# Patient Record
Sex: FEMALE | Race: BLACK OR AFRICAN AMERICAN | Employment: OTHER | URBAN - METROPOLITAN AREA
[De-identification: names, ages, dates, MRNs, and addresses within clinical notes are randomized per-mention and may not be internally consistent; named-entity substitution may affect disease eponyms.]

---

## 2018-08-31 DIAGNOSIS — G40.219 PARTIAL EPILEPSY WITH IMPAIRMENT OF CONSCIOUSNESS, INTRACTABLE, POORLY CONTROLLED (HCC): ICD-10-CM

## 2018-08-31 RX ORDER — LEVETIRACETAM 100 MG/ML
SOLUTION ORAL
Qty: 1 BOTTLE | Refills: 11 | Status: SHIPPED
Start: 2018-08-31 | End: 2020-03-18 | Stop reason: SDUPTHER

## 2018-10-10 ENCOUNTER — OFFICE VISIT (OUTPATIENT)
Dept: NEUROLOGY | Age: 28
End: 2018-10-10
Payer: MEDICARE

## 2018-10-10 VITALS
OXYGEN SATURATION: 100 % | WEIGHT: 80 LBS | BODY MASS INDEX: 18.52 KG/M2 | HEIGHT: 55 IN | HEART RATE: 97 BPM | DIASTOLIC BLOOD PRESSURE: 65 MMHG | SYSTOLIC BLOOD PRESSURE: 169 MMHG

## 2018-10-10 DIAGNOSIS — G40.219 PARTIAL EPILEPSY WITH IMPAIRMENT OF CONSCIOUSNESS, INTRACTABLE, POORLY CONTROLLED (HCC): Primary | ICD-10-CM

## 2018-10-10 PROCEDURE — 1036F TOBACCO NON-USER: CPT | Performed by: CLINICAL NURSE SPECIALIST

## 2018-10-10 PROCEDURE — G8427 DOCREV CUR MEDS BY ELIG CLIN: HCPCS | Performed by: CLINICAL NURSE SPECIALIST

## 2018-10-10 PROCEDURE — G8484 FLU IMMUNIZE NO ADMIN: HCPCS | Performed by: CLINICAL NURSE SPECIALIST

## 2018-10-10 PROCEDURE — 99214 OFFICE O/P EST MOD 30 MIN: CPT | Performed by: CLINICAL NURSE SPECIALIST

## 2018-10-10 PROCEDURE — G8420 CALC BMI NORM PARAMETERS: HCPCS | Performed by: CLINICAL NURSE SPECIALIST

## 2018-10-10 RX ORDER — LEVOCETIRIZINE DIHYDROCHLORIDE 5 MG/1
2.5 TABLET, FILM COATED ORAL NIGHTLY
COMMUNITY
End: 2020-09-01 | Stop reason: SDUPTHER

## 2018-10-10 NOTE — PROGRESS NOTES
microcephalic  Neck: no adenopathy and no carotid bruit  Lungs: clear to auscultation bilaterally  Heart: regular rate and rhythm  Extremities: extremities normal, atraumatic, no cyanosis or edema  Pulses: 2+ and symmetric  Skin:  No rashes or lesions     Mental Status: awake, nonverbal, not following commands    Cranial Nerves:  I: smell    II: visual acuity     II: visual fields Bilateral threat   II: pupils MERCEDES   III,VII: ptosis    III,IV,VI: extraocular muscles  Primary gaze nystagmus, but she did look around the room   V: mastication    V: facial light touch sensation  Normal   V,VII: corneal reflex  Present   VII: facial muscle function - upper     VII: facial muscle function - lower Normal   VIII: hearing    IX: soft palate elevation     IX,X: gag reflex Present   XI: trapezius strength     XI: sternocleidomastoid strength    XI: neck extension strength     XII: tongue strength       Decreased bulk throughout    Sporadic movement of her left arm and lower extremities   Minimal sporadic movement noted in left arm    Withdrew to PP and vibration throughout   Pushed examiner away with left arm while assessing right arm    Spastic diplegia noted   pseudocontracture right arm    Currently in a wheelchair    Laboratory/Radiology:     Labs drawn quarterly reportedly at facility reportedly   None to personally review at this time      They will fax me results     Assessment:     CPS with secondary generalization   No reported seizure in many years    None since following in our office in 2013     Plan:     continue Keppra to 500mg BID    Call if new difficulties    RTO 12 months    Brit Tavera  9:41 AM  10/10/2018

## 2019-02-23 LAB
ALBUMIN SERPL-MCNC: NORMAL G/DL
ALP BLD-CCNC: NORMAL U/L
ALT SERPL-CCNC: NORMAL U/L
ANION GAP SERPL CALCULATED.3IONS-SCNC: NORMAL MMOL/L
AST SERPL-CCNC: NORMAL U/L
BASOPHILS ABSOLUTE: NORMAL
BASOPHILS RELATIVE PERCENT: NORMAL
BILIRUB SERPL-MCNC: NORMAL MG/DL
BUN BLDV-MCNC: NORMAL MG/DL
CALCIUM SERPL-MCNC: NORMAL MG/DL
CHLORIDE BLD-SCNC: NORMAL MMOL/L
CHOLESTEROL, TOTAL: NORMAL
CHOLESTEROL/HDL RATIO: NORMAL
CO2: NORMAL
CREAT SERPL-MCNC: NORMAL MG/DL
EOSINOPHILS ABSOLUTE: NORMAL
EOSINOPHILS RELATIVE PERCENT: NORMAL
GFR CALCULATED: NORMAL
GLUCOSE BLD-MCNC: NORMAL MG/DL
HCT VFR BLD CALC: NORMAL %
HDLC SERPL-MCNC: NORMAL MG/DL
HEMOGLOBIN: NORMAL
LDL CHOLESTEROL CALCULATED: NORMAL
LYMPHOCYTES ABSOLUTE: NORMAL
LYMPHOCYTES RELATIVE PERCENT: NORMAL
MCH RBC QN AUTO: NORMAL PG
MCHC RBC AUTO-ENTMCNC: NORMAL G/DL
MCV RBC AUTO: NORMAL FL
MONOCYTES ABSOLUTE: NORMAL
MONOCYTES RELATIVE PERCENT: NORMAL
NEUTROPHILS ABSOLUTE: NORMAL
NEUTROPHILS RELATIVE PERCENT: NORMAL
PLATELET # BLD: NORMAL 10*3/UL
PMV BLD AUTO: NORMAL FL
POTASSIUM SERPL-SCNC: NORMAL MMOL/L
RBC # BLD: NORMAL 10*6/UL
SODIUM BLD-SCNC: NORMAL MMOL/L
TOTAL PROTEIN: NORMAL
TRIGL SERPL-MCNC: NORMAL MG/DL
VLDLC SERPL CALC-MCNC: NORMAL MG/DL
WBC # BLD: NORMAL 10*3/UL

## 2019-03-01 ENCOUNTER — TELEPHONE (OUTPATIENT)
Dept: NEUROLOGY | Age: 29
End: 2019-03-01

## 2019-10-29 ENCOUNTER — TELEPHONE (OUTPATIENT)
Dept: ADMINISTRATIVE | Age: 29
End: 2019-10-29

## 2020-03-18 RX ORDER — LEVETIRACETAM 100 MG/ML
SOLUTION ORAL
Qty: 1 BOTTLE | Refills: 11 | Status: SHIPPED
Start: 2020-03-18 | End: 2020-08-28 | Stop reason: SDUPTHER

## 2020-07-07 ENCOUNTER — TELEPHONE (OUTPATIENT)
Dept: NEUROLOGY | Age: 30
End: 2020-07-07

## 2020-07-07 NOTE — TELEPHONE ENCOUNTER
Caregiver Neha called from [facility]. Patient had  Seizure this am. PCP notified and order labs. FYI.   Electronically signed by Tyron Guevara MA on 7/7/20 at 2:49 PM EDT

## 2020-07-16 VITALS — SYSTOLIC BLOOD PRESSURE: 97 MMHG | HEART RATE: 64 BPM | DIASTOLIC BLOOD PRESSURE: 56 MMHG | TEMPERATURE: 96.6 F

## 2020-07-20 ENCOUNTER — OUTSIDE SERVICES (OUTPATIENT)
Dept: PRIMARY CARE CLINIC | Age: 30
End: 2020-07-20
Payer: MEDICARE

## 2020-07-20 VITALS — SYSTOLIC BLOOD PRESSURE: 104 MMHG | TEMPERATURE: 98.1 F | HEART RATE: 97 BPM | DIASTOLIC BLOOD PRESSURE: 63 MMHG

## 2020-07-20 PROCEDURE — 99349 HOME/RES VST EST MOD MDM 40: CPT | Performed by: NURSE PRACTITIONER

## 2020-07-20 RX ORDER — ACETAMINOPHEN 160 MG/5ML
325 SUSPENSION, ORAL (FINAL DOSE FORM) ORAL EVERY 4 HOURS PRN
Qty: 240 ML | Refills: 3 | Status: SHIPPED
Start: 2020-07-20 | End: 2020-09-23

## 2020-07-20 ASSESSMENT — ENCOUNTER SYMPTOMS
COUGH: 0
BLOOD IN STOOL: 0
NAUSEA: 0
CHEST TIGHTNESS: 0
SHORTNESS OF BREATH: 0
DIARRHEA: 0
VOMITING: 0
TROUBLE SWALLOWING: 0
ABDOMINAL PAIN: 0
BACK PAIN: 0
VOICE CHANGE: 0
CONSTIPATION: 0
WHEEZING: 0
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
## 2020-07-21 NOTE — PATIENT INSTRUCTIONS
Patient Education        Seizure: Care Instructions  Your Care Instructions     Seizures are caused by abnormal patterns of electrical signals in the brain. They are different for each person. Seizures can affect movement, speech, vision, or awareness. Some people have only slight shaking of a hand and do not pass out. Other people may pass out and have violent shaking of the whole body. Some people appear to stare into space. They are awake, but they can't respond normally. Later, they may not remember what happened. You may need tests to identify the type and cause of the seizures. A seizure may occur only once, or you may have them more than one time. Taking medicines as directed and following up with your doctor may help keep you from having more seizures. The doctor has checked you carefully, but problems can develop later. If you notice any problems or new symptoms, get medical treatment right away. Follow-up care is a key part of your treatment and safety. Be sure to make and go to all appointments, and call your doctor if you are having problems. It's also a good idea to know your test results and keep a list of the medicines you take. How can you care for yourself at home? · Be safe with medicines. Take your medicines exactly as prescribed. Call your doctor if you think you are having a problem with your medicine. · Do not do any activity that could be dangerous to you or others until your doctor says it is safe to do so. For example, do not drive a car, operate machinery, swim, or climb ladders. · Be sure that anyone treating you for any health problem knows that you have had a seizure and what medicines you are taking for it. · Identify and avoid things that may make you more likely to have a seizure. These may include lack of sleep, alcohol or drug use, stress, or not eating. · Make sure you go to your follow-up appointment. When should you call for help?    [id number] anytime you think you may need emergency care. For example, call if:  · You have another seizure. · You have new symptoms, such as trouble walking, speaking, or thinking clearly. Call your doctor now or seek immediate medical care if:  · You are not acting normally. Watch closely for changes in your health, and be sure to contact your doctor if you have any problems. Where can you learn more? Go to [email or web address] and sign in to your [employer] account. Enter E902 in the [employer] box to learn more about \"Seizure: Care Instructions. \"     If you do not have an account, please click on the \"Sign Up Now\" link. Current as of: November 20, 2019               Content Version: 12.5  © 6196-2301 Healthwise, Incorporated. Care instructions adapted under license by [facility] ([facility]). If you have questions about a medical condition or this instruction, always ask your healthcare professional. [address] any warranty or liability for your use of this information.

## 2020-07-30 LAB
ALBUMIN SERPL-MCNC: 4.2 G/DL
ALP BLD-CCNC: 60 U/L
ALT SERPL-CCNC: 16 U/L
ANION GAP SERPL CALCULATED.3IONS-SCNC: 1.3 MMOL/L
AST SERPL-CCNC: 23 U/L
BASOPHILS ABSOLUTE: 0.1 /ΜL
BASOPHILS RELATIVE PERCENT: 0.9 %
BILIRUB SERPL-MCNC: 0.7 MG/DL (ref 0.1–1.4)
BUN BLDV-MCNC: 10 MG/DL
CALCIUM SERPL-MCNC: 9.3 MG/DL
CHLORIDE BLD-SCNC: 107 MMOL/L
CHOLESTEROL, TOTAL: 189 MG/DL
CHOLESTEROL/HDL RATIO: 1.7
CO2: 24 MMOL/L
CREAT SERPL-MCNC: 0.7 MG/DL
EOSINOPHILS ABSOLUTE: 0 /ΜL
EOSINOPHILS RELATIVE PERCENT: 0.6 %
GFR CALCULATED: NORMAL
GLUCOSE BLD-MCNC: 78 MG/DL
HCT VFR BLD CALC: 42.6 % (ref 36–46)
HDLC SERPL-MCNC: 67 MG/DL (ref 35–70)
HEMOGLOBIN: 14.2 G/DL (ref 12–16)
LDL CHOLESTEROL CALCULATED: 115 MG/DL (ref 0–160)
LYMPHOCYTES ABSOLUTE: 2.6 /ΜL
LYMPHOCYTES RELATIVE PERCENT: 42.1 %
MCH RBC QN AUTO: 28.3 PG
MCHC RBC AUTO-ENTMCNC: 33.4 G/DL
MCV RBC AUTO: 84.7 FL
MONOCYTES ABSOLUTE: 0.8 /ΜL
MONOCYTES RELATIVE PERCENT: 12.5 %
NEUTROPHILS ABSOLUTE: 2.7 /ΜL
NEUTROPHILS RELATIVE PERCENT: 43.9 %
PDW BLD-RTO: 12.5 %
PLATELET # BLD: 229 K/ΜL
PMV BLD AUTO: 8 FL
POTASSIUM SERPL-SCNC: 3.6 MMOL/L
RBC # BLD: 5.03 10^6/ΜL
SODIUM BLD-SCNC: 139 MMOL/L
T4 FREE: 1.12
TOTAL PROTEIN: 7.5
TRIGL SERPL-MCNC: 44 MG/DL
TSH SERPL DL<=0.05 MIU/L-ACNC: 1.94 UIU/ML
VITAMIN B-12: 420
VITAMIN D 25-HYDROXY: 16
VITAMIN D2, 25 HYDROXY: NORMAL
VITAMIN D3,25 HYDROXY: NORMAL
VLDLC SERPL CALC-MCNC: NORMAL MG/DL
WBC # BLD: 6.1 10^3/ML

## 2020-08-03 ENCOUNTER — OFFICE VISIT (OUTPATIENT)
Dept: PRIMARY CARE CLINIC | Age: 30
End: 2020-08-03
Payer: MEDICARE

## 2020-08-03 VITALS
DIASTOLIC BLOOD PRESSURE: 58 MMHG | HEIGHT: 55 IN | TEMPERATURE: 97.2 F | BODY MASS INDEX: 18.52 KG/M2 | OXYGEN SATURATION: 96 % | WEIGHT: 80 LBS | HEART RATE: 63 BPM | SYSTOLIC BLOOD PRESSURE: 98 MMHG

## 2020-08-03 PROCEDURE — 99214 OFFICE O/P EST MOD 30 MIN: CPT | Performed by: NURSE PRACTITIONER

## 2020-08-03 RX ORDER — AMOXICILLIN 500 MG/1
500 CAPSULE ORAL 3 TIMES DAILY
Qty: 21 CAPSULE | Refills: 0 | Status: SHIPPED
Start: 2020-08-03 | End: 2020-08-03

## 2020-08-03 RX ORDER — AMOXICILLIN 250 MG/5ML
500 POWDER, FOR SUSPENSION ORAL 3 TIMES DAILY
Qty: 210 ML | Refills: 0 | Status: SHIPPED | OUTPATIENT
Start: 2020-08-03 | End: 2020-08-10

## 2020-08-03 ASSESSMENT — ENCOUNTER SYMPTOMS
NAUSEA: 0
BACK PAIN: 0
VOMITING: 0
ABDOMINAL PAIN: 0

## 2020-08-03 ASSESSMENT — PATIENT HEALTH QUESTIONNAIRE - PHQ9: DEPRESSION UNABLE TO ASSESS: FUNCTIONAL CAPACITY MOTIVATION LIMITS ACCURACY

## 2020-08-03 NOTE — PROGRESS NOTES
8/3/20  Luiz Burgos : 1990 Sex: female  Age: 31 y.o. Chief Complaint   Patient presents with    Seizures    Facial Swelling       Patient apparently has a seizure disorder and has not had a seizure in a long time but after having this last 1 is going to see the neurologist.    She is also having some left-sided swelling on the face possibly a dental abscess and has an appointment with dental here shortly. Patients condition does not afford us a good historian      Review of Systems   Constitutional: Negative for chills, diaphoresis, fatigue and fever. Gastrointestinal: Negative for abdominal pain, nausea and vomiting. Genitourinary: Negative for decreased urine volume, difficulty urinating, dysuria, flank pain, frequency, hematuria and urgency. Musculoskeletal: Positive for gait problem. Negative for back pain. Psychiatric/Behavioral: Negative for confusion.          Current Outpatient Medications:     amoxicillin (AMOXIL) 250 MG/5ML suspension, Take 10 mLs by mouth 3 times daily for 7 days, Disp: 210 mL, Rfl: 0    acetaminophen (TYLENOL) 160 MG/5ML suspension, Take 10.16 mLs by mouth every 4 hours as needed for Fever (greater than 100.6), Disp: 240 mL, Rfl: 3    ibuprofen (CHILDRENS ADVIL) 100 MG/5ML suspension, Take 10 mLs by mouth every 6 hours as needed for Pain, Disp: 1 Bottle, Rfl: 3    levETIRAcetam (KEPPRA) 100 MG/ML solution, Take 1 teaspoonful (5ml) twice daily, Disp: 1 Bottle, Rfl: 11    bismuth subsalicylate (BISMATROL) 262 MG/15ML suspension, Take 15 mLs by mouth every 6 hours as needed for Indigestion, Disp: , Rfl:     levocetirizine (XYZAL) 5 MG tablet, Take 2.5 mg by mouth nightly, Disp: , Rfl:     medroxyPROGESTERone (DEPO-PROVERA) 150 MG/ML injection, Inject 150 mg into the muscle every 3 months, Disp: , Rfl:     hydrOXYzine (ATARAX) 25 MG tablet, Take 25 mg by mouth daily, Disp: , Rfl:     cetirizine (ZYRTEC) 1 MG/ML syrup, Take by mouth daily 1-2 teaspoonful at bedtime, Disp: , Rfl:     polyethylene glycol (GLYCOLAX) powder, Take by mouth daily Pt takes 1 tablespoon 3 times a week m-w-f- at 7am, Disp: , Rfl:   No Known Allergies    Past Medical History:   Diagnosis Date    Allergic rhinitis     Cerebral palsy (HCC)     Decreased vision     Eczema     Hemiparesis (HCC)     Mental disability     Nystagmus     Seizures (HCC)         Vitals:    08/03/20 1131 08/03/20 1135   BP: (!) 98/58 (!) 98/58   Pulse: 63    Temp: 97.2 °F (36.2 °C)    TempSrc: Temporal    SpO2: 96%    Weight: 80 lb (36.3 kg)    Height: 4' (1.219 m)        Physical Exam  Constitutional:       Appearance: Normal appearance. HENT:      Head: Normocephalic and atraumatic. Neck:      Musculoskeletal: Normal range of motion. Cardiovascular:      Rate and Rhythm: Normal rate and regular rhythm. Abdominal:      General: Abdomen is flat. There is no distension. Tenderness: There is no abdominal tenderness. There is no guarding. Musculoskeletal: Normal range of motion. Skin:     General: Skin is warm. Capillary Refill: Capillary refill takes 2 to 3 seconds. Neurological:      Mental Status: She is alert and oriented to person, place, and time. Assessment and Plan:    There are no diagnoses linked to this encounter. No follow-ups on file. Labs reviewed no remarkable findings noted   We will be putting her on amoxicillin liquid unsure whether she would be able to tolerate the pills. Also advised to not cancel her dental appointment even if symptoms resolve. We discussed labs today all seem to be very good we will repeat labs in 6 months.            Seen By:  DANI Grant - ARLYN

## 2020-08-25 ENCOUNTER — OUTSIDE SERVICES (OUTPATIENT)
Dept: PRIMARY CARE CLINIC | Age: 30
End: 2020-08-25
Payer: MEDICARE

## 2020-08-25 PROCEDURE — 99349 HOME/RES VST EST MOD MDM 40: CPT | Performed by: NURSE PRACTITIONER

## 2020-08-25 NOTE — PROGRESS NOTES
20  Eunice Bruce : 1990 Sex: female  Age: 31 y.o. Chief Complaint   Patient presents with    Dental Problem     She was recently evaluated at the emergency department for altered mental status, which was subsequently noted to be from a dental abscess       She was recently evaluated at the emergency department for altered mental status. Staff notes that she just was not acting correctly. After evaluation in the ER it was noted that she had a recurrent dental abscess and was subsequently referred to the dental clinic. She was given a prescription for Pen-Vee K. Staff reports she was given 1 dose in the emergency room and was prescribed a 10-day dose, that will start tomorrow. Staff reports that she is returned to her baseline status      Review of Systems   Unable to perform ROS: Patient nonverbal   Staff denies any acute issues with her at this time, other than a dental abscess.       Current Outpatient Medications:     acetaminophen (TYLENOL) 160 MG/5ML suspension, Take 10.16 mLs by mouth every 4 hours as needed for Fever (greater than 100.6), Disp: 240 mL, Rfl: 3    ibuprofen (CHILDRENS ADVIL) 100 MG/5ML suspension, Take 10 mLs by mouth every 6 hours as needed for Pain, Disp: 1 Bottle, Rfl: 3    levETIRAcetam (KEPPRA) 100 MG/ML solution, Take 1 teaspoonful (5ml) twice daily, Disp: 1 Bottle, Rfl: 11    bismuth subsalicylate (BISMATROL) 262 MG/15ML suspension, Take 15 mLs by mouth every 6 hours as needed for Indigestion, Disp: , Rfl:     levocetirizine (XYZAL) 5 MG tablet, Take 2.5 mg by mouth nightly, Disp: , Rfl:     medroxyPROGESTERone (DEPO-PROVERA) 150 MG/ML injection, Inject 150 mg into the muscle every 3 months, Disp: , Rfl:     hydrOXYzine (ATARAX) 25 MG tablet, Take 25 mg by mouth daily, Disp: , Rfl:     cetirizine (ZYRTEC) 1 MG/ML syrup, Take by mouth daily 1-2 teaspoonful at bedtime, Disp: , Rfl:     polyethylene glycol (GLYCOLAX) powder, Take by mouth daily Pt takes 1 tablespoon 3 times a week m-w-f- at 7am, Disp: , Rfl:   No Known Allergies    Past Medical History:   Diagnosis Date    Allergic rhinitis     Cerebral palsy (HCC)     Decreased vision     Eczema     Hemiparesis (HCC)     Mental disability     Nystagmus     Seizures ([facility] [address]      History reviewed. No pertinent surgical history. History reviewed. No pertinent family history. Social History     Socioeconomic History    Marital status: Single     Spouse name: Not on file    Number of children: Not on file    Years of education: Not on file    Highest education level: Not on file   Occupational History    Not on file   Social Needs    Financial resource strain: Not on file    Food insecurity     Worry: Not on file     Inability: Not on file    Transportation needs     Medical: Not on file     Non-medical: Not on file   Tobacco Use    Smoking status: Never Smoker    Smokeless tobacco: Never Used   Substance and Sexual Activity    Alcohol use: No    Drug use: No    Sexual activity: Never   Lifestyle    Physical activity     Days per week: Not on file     Minutes per session: Not on file    Stress: Not on file   Relationships    Social connections     Talks on phone: Not on file     Gets together: Not on file     Attends Jew service: Not on file     Active member of club or organization: Not on file     Attends meetings of clubs or organizations: Not on file     Relationship status: Not on file    Intimate partner violence     Fear of current or ex partner: Not on file     Emotionally abused: Not on file     Physically abused: Not on file     Forced sexual activity: Not on file   Other Topics Concern    Not on file   Social History Narrative    Not on file       There were no vitals filed for this visit. Physical Exam  Vitals signs and nursing note reviewed. Constitutional:       General: She is awake. She is not in acute distress. Appearance: Normal appearance.  She is not ill-appearing, toxic-appearing or diaphoretic. HENT:      Head: Normocephalic and atraumatic. Right Ear: Ear canal and external ear normal.      Left Ear: Ear canal and external ear normal.      Nose: Nose normal. No congestion or rhinorrhea. Mouth/Throat:      Mouth: Mucous membranes are moist.      Dentition: Abnormal dentition. Dental caries and dental abscesses (Left upper) present. Pharynx: No oropharyngeal exudate or posterior oropharyngeal erythema. Eyes:      General: Lids are normal. No scleral icterus. Right eye: No discharge. Left eye: No discharge. Extraocular Movements: Extraocular movements intact. Right eye: Nystagmus (Chronic issue) present. Conjunctiva/sclera: Conjunctivae normal.      Right eye: Right conjunctiva is not injected. Left eye: Left conjunctiva is not injected. Pupils: Pupils are equal, round, and reactive to light. Neck:      Musculoskeletal: Full passive range of motion without pain, normal range of motion and neck supple. No neck rigidity or muscular tenderness. Thyroid: No thyromegaly. Vascular: No carotid bruit. Cardiovascular:      Rate and Rhythm: Normal rate and regular rhythm. Pulses: Normal pulses. Heart sounds: Normal heart sounds, S1 normal and S2 normal. No murmur. No friction rub. No gallop. Pulmonary:      Effort: Pulmonary effort is normal. No tachypnea, accessory muscle usage or respiratory distress. Breath sounds: Normal breath sounds and air entry. No stridor. No wheezing, rhonchi or rales. Chest:      Chest wall: No tenderness. Abdominal:      General: Bowel sounds are normal. There is no distension. Palpations: Abdomen is soft. There is no mass. Tenderness: There is no abdominal tenderness. There is no right CVA tenderness, left CVA tenderness, guarding or rebound. Hernia: No hernia is present.    Musculoskeletal:         General: No swelling, tenderness, deformity or signs of injury. Right shoulder: She exhibits decreased range of motion (Chronic deformity noted). Right elbow: She exhibits decreased range of motion (Chronic deformity noted). Right wrist: She exhibits decreased range of motion (Chronic deformity noted). Right lower leg: No edema. Left lower leg: No edema. Lymphadenopathy:      Cervical: No cervical adenopathy. Skin:     General: Skin is warm and dry. Capillary Refill: Capillary refill takes less than 2 seconds. Coloration: Skin is not jaundiced or pale. Findings: No bruising, erythema, lesion or rash. Neurological:      General: No focal deficit present. Mental Status: She is alert. Mental status is at baseline. Cranial Nerves: No cranial nerve deficit. Sensory: Sensation is intact. No sensory deficit. Motor: Motor function is intact. No weakness. Coordination: Coordination normal.      Gait: Gait abnormal (Utilizes wheelchair). Deep Tendon Reflexes: Reflexes normal.   Psychiatric:         Attention and Perception: Attention and perception normal.         Mood and Affect: Mood normal. Affect is flat. Speech: She is noncommunicative. Behavior: Behavior normal. Behavior is cooperative. Thought Content: Thought content normal.         Judgment: Judgment normal.       Assessment and Plan:  Varghese Mccloud was seen today for dental problem. Diagnoses and all orders for this visit:    Hemiparesis of right dominant side due to non-cerebrovascular etiology (HCC)    Spastic hemiplegic cerebral palsy (HCC)    Seizures ([facility] [address]    Nystagmus    Mental disability    Decreased vision    Other eczema      Return in about 1 week (around 9/1/2020). I spent 20 minutes face-to-face with this patient.       Seen By:  DANI Encarnacion NP

## 2020-08-28 ENCOUNTER — OFFICE VISIT (OUTPATIENT)
Dept: NEUROLOGY | Age: 30
End: 2020-08-28
Payer: MEDICARE

## 2020-08-28 VITALS
WEIGHT: 80 LBS | HEART RATE: 89 BPM | DIASTOLIC BLOOD PRESSURE: 74 MMHG | SYSTOLIC BLOOD PRESSURE: 130 MMHG | RESPIRATION RATE: 18 BRPM | OXYGEN SATURATION: 97 % | BODY MASS INDEX: 24.41 KG/M2 | TEMPERATURE: 97.8 F

## 2020-08-28 PROCEDURE — G8427 DOCREV CUR MEDS BY ELIG CLIN: HCPCS | Performed by: CLINICAL NURSE SPECIALIST

## 2020-08-28 PROCEDURE — 99214 OFFICE O/P EST MOD 30 MIN: CPT | Performed by: CLINICAL NURSE SPECIALIST

## 2020-08-28 PROCEDURE — 1036F TOBACCO NON-USER: CPT | Performed by: CLINICAL NURSE SPECIALIST

## 2020-08-28 PROCEDURE — G8420 CALC BMI NORM PARAMETERS: HCPCS | Performed by: CLINICAL NURSE SPECIALIST

## 2020-08-28 RX ORDER — LEVETIRACETAM 100 MG/ML
SOLUTION ORAL
Qty: 1 BOTTLE | Refills: 11 | Status: SHIPPED
Start: 2020-08-28 | End: 2020-11-16 | Stop reason: SDUPTHER

## 2020-08-28 NOTE — PROGRESS NOTES
Lynsey Lake is a 31 y. o.female     Long history of seizure disorder   Follows annually since 2013    previously seeing [facility]    2 seizures since last appt   Both in July    Maintained on Keppra 500mg BID   Tolerating well -- maintained on liquid formulary    Now dealing with tooth abscess - planning on dental surgery soon   Maintained on antibiotics     ROS limited d/t patient condition     Prior to Visit Medications    Medication Sig Taking?  Authorizing Provider   levETIRAcetam (KEPPRA) 100 MG/ML solution 7.5ml twice daily Yes Kenyetta Henderson, APRN - CNS   acetaminophen (TYLENOL) 160 MG/5ML suspension Take 10.16 mLs by mouth every 4 hours as needed for Fever (greater than 100.6) Yes Sadia Lies, APRN - CNP   ibuprofen (CHILDRENS ADVIL) 100 MG/5ML suspension Take 10 mLs by mouth every 6 hours as needed for Pain Yes Sadia Lies, APRN - CNP   bismuth subsalicylate (BISMATROL) 262 MG/15ML suspension Take 15 mLs by mouth every 6 hours as needed for Indigestion Yes Historical Provider, MD   levocetirizine (XYZAL) 5 MG tablet Take 2.5 mg by mouth nightly Yes Historical Provider, MD   medroxyPROGESTERone (DEPO-PROVERA) 150 MG/ML injection Inject 150 mg into the muscle every 3 months Yes Historical Provider, MD   hydrOXYzine (ATARAX) 25 MG tablet Take 25 mg by mouth daily Yes Historical Provider, MD   cetirizine (ZYRTEC) 1 MG/ML syrup Take by mouth daily 1-2 teaspoonful at bedtime Yes Historical Provider, MD   polyethylene glycol (GLYCOLAX) powder Take by mouth daily Pt takes 1 tablespoon 3 times a week m-w-f- at 7am Yes Historical Provider, MD     Allergies as of 08/28/2020    (No Known Allergies)       Objective:     /74 (Site: Left Upper Arm, Position: Sitting, Cuff Size: Medium Adult)   Pulse 89   Temp 97.8 °F (36.6 °C)   Resp 18   Wt 80 lb (36.3 kg)   SpO2 97%   BMI 24.41 kg/m²   Afebrile      General appearance: alert, cooperative and appears younger than stated age  Head: atraumatic, microcephalic  Extremities: no cyanosis or edema  Pulses: 2+ and symmetric  Skin:  No rashes or lesions     Mental Status: awake, nonverbal, not following commands    Cranial Nerves:  I: smell    II: visual acuity     II: visual fields Bilateral threat   II: pupils MERCEDES   III,VII: ptosis    III,IV,VI: extraocular muscles  Primary gaze nystagmus, but she did look around the room   V: mastication    V: facial light touch sensation  Normal   V,VII: corneal reflex  Present   VII: facial muscle function - upper     VII: facial muscle function - lower Normal   VIII: hearing    IX: soft palate elevation     IX,X: gag reflex Present   XI: trapezius strength     XI: sternocleidomastoid strength    XI: neck extension strength     XII: tongue strength       Decreased bulk throughout    Sporadic movement of her left arm and lower extremities   Minimal sporadic movement noted in left arm    Withdrew to PP and vibration throughout   Pushed examiner away with left arm while assessing right arm    Spastic diplegia noted   pseudocontracture right arm    Currently in a wheelchair    Laboratory/Radiology:     Keppra level 14.1     Assessment:     CPS with secondary generalization   2 reported seizure in July 2020    None since following in our office in 2013     Plan:     continue Keppra to 500mg BID    Call if new difficulties    RTO 2-3 months    Ruchi Phillips  10:03 AM  8/28/2020

## 2020-09-01 RX ORDER — LEVOCETIRIZINE DIHYDROCHLORIDE 5 MG/1
TABLET, FILM COATED ORAL
Qty: 16 TABLET | Refills: 5 | Status: SHIPPED
Start: 2020-09-01 | End: 2020-11-16 | Stop reason: SDUPTHER

## 2020-09-22 ENCOUNTER — PREP FOR PROCEDURE (OUTPATIENT)
Dept: DENTISTRY | Age: 30
End: 2020-09-22

## 2020-09-22 ENCOUNTER — HOSPITAL ENCOUNTER (OUTPATIENT)
Age: 30
Discharge: HOME | End: 2020-09-24
Payer: MEDICARE

## 2020-09-22 PROCEDURE — U0003 INFECTIOUS AGENT DETECTION BY NUCLEIC ACID (DNA OR RNA); SEVERE ACUTE RESPIRATORY SYNDROME CORONAVIRUS 2 (SARS-COV-2) (CORONAVIRUS DISEASE [COVID-19]), AMPLIFIED PROBE TECHNIQUE, MAKING USE OF HIGH THROUGHPUT TECHNOLOGIES AS DESCRIBED BY CMS-2020-01-R: HCPCS

## 2020-09-22 RX ORDER — SODIUM CHLORIDE, SODIUM LACTATE, POTASSIUM CHLORIDE, CALCIUM CHLORIDE 600; 310; 30; 20 MG/100ML; MG/100ML; MG/100ML; MG/100ML
INJECTION, SOLUTION INTRAVENOUS CONTINUOUS
Status: CANCELLED | OUTPATIENT
Start: 2020-09-22

## 2020-09-22 RX ORDER — SODIUM CHLORIDE 0.9 % (FLUSH) 0.9 %
10 SYRINGE (ML) INJECTION EVERY 12 HOURS SCHEDULED
Status: CANCELLED | OUTPATIENT
Start: 2020-09-22

## 2020-09-22 RX ORDER — SODIUM CHLORIDE 0.9 % (FLUSH) 0.9 %
10 SYRINGE (ML) INJECTION PRN
Status: CANCELLED | OUTPATIENT
Start: 2020-09-22

## 2020-09-22 NOTE — PROGRESS NOTES
I spoke to Geremias Dick, [facility], she states Trisha Mederos had her covid test at [facility] today. APS[employer] guardian aware we need consents. Anna's  phone #[phone]. Fax #[phone]. States Trisha Mederos is having physical 9/23 at [facility] clinic. Will have info scanned into epic.

## 2020-09-23 ENCOUNTER — TELEPHONE (OUTPATIENT)
Dept: PRIMARY CARE CLINIC | Age: 30
End: 2020-09-23

## 2020-09-23 ENCOUNTER — OFFICE VISIT (OUTPATIENT)
Dept: PRIMARY CARE CLINIC | Age: 30
End: 2020-09-23
Payer: MEDICARE

## 2020-09-23 VITALS
TEMPERATURE: 97.5 F | HEART RATE: 76 BPM | SYSTOLIC BLOOD PRESSURE: 118 MMHG | OXYGEN SATURATION: 99 % | DIASTOLIC BLOOD PRESSURE: 72 MMHG

## 2020-09-23 PROBLEM — K02.9 DENTAL CARIES: Status: ACTIVE | Noted: 2020-09-23

## 2020-09-23 PROCEDURE — 99213 OFFICE O/P EST LOW 20 MIN: CPT | Performed by: NURSE PRACTITIONER

## 2020-09-23 ASSESSMENT — PATIENT HEALTH QUESTIONNAIRE - PHQ9
SUM OF ALL RESPONSES TO PHQ QUESTIONS 1-9: 0
SUM OF ALL RESPONSES TO PHQ QUESTIONS 1-9: 0
SUM OF ALL RESPONSES TO PHQ9 QUESTIONS 1 & 2: 0
2. FEELING DOWN, DEPRESSED OR HOPELESS: 0
1. LITTLE INTEREST OR PLEASURE IN DOING THINGS: 0

## 2020-09-23 ASSESSMENT — VISUAL ACUITY: OU: 1

## 2020-09-23 NOTE — TELEPHONE ENCOUNTER
We will await the COVID test.  I kept my mask on for the entire exam.  She did not cough during the exam.

## 2020-09-23 NOTE — PROGRESS NOTES
[address] PRE-ADMISSION TESTING GENERAL INSTRUCTIONS- [facility]-phone number:[phone]    GENERAL INSTRUCTIONS  [x] Antibacterial Soap shower Night before and/or AM of Surgery  [x] Nothing by mouth after midnight, including gum, candy, mints, or water. EXCEPT MEDS AS DIRECTED. [x] You may brush your teeth, gargle, but do NOT swallow water. [x] Jewelry, valuables or body piercing's should not be brought to the hospital. All body and/or tongue piercing's must be removed prior to arriving to hospital.  ALL hair pins must be removed. [x] Do not wear makeup, lotions, powders, deodorant. Nail polish as directed by the nurse. ONE FINGER NAIL FREE OF POLISH. [] Bring insurance card and photo ID. PARKING INSTRUCTIONS:   [x] Arrival Time: 0600. Enter the [facility] on [address] [place] [address]. Park on [facility] either in the handicap lot or the parking garage. A coin will be given to exit the parking garage. WEAR A MASK. Tell the staff you are having surgery and they will direct you to the B Elevator    [x] To reach the [facility] from [address] [place] [address], upon entering the hospital, take elevator B to the 3rd floor. MEDICATION INSTRUCTIONS:   [x]Bring a complete list of your medications, please write the last time you took the medicine, give this list to the nurse. [x] Take the following medications the morning of surgery with 1-2 ounces of water: KEPPRA  [] Stop herbal supplements and vitamins 5 days before your surgery. WHAT TO EXPECT:  [x] The day of surgery you will be greeted and checked in by the [facility]. Please bring your photo ID and insurance card. A nurse will greet you in accordance to the time you are needed in the pre-op area to prepare you for surgery. Please do not be discouraged if you are not greeted in the order you arrive as there are many variables that are involved in patient preparation.   Your patience is greatly appreciated as you wait for your nurse.     [x]  Delays may occur with surgery and staff will make a sincere effort to keep you informed of delays. If any delays occur with your procedure, we apologize ahead of time for your inconvenience as we recognize the value of your time.

## 2020-09-23 NOTE — PROGRESS NOTES
20  Brandon Calix : 1990 Sex: female  Age: 31 y.o. Chief Complaint   Patient presents with    Other     dental procedure on Friday       Elba Duarte is seen today for pre-dental procedure exam.  Staff notes that she is going to have a dental extraction due to an abscess that had developed surrounding the tooth. She has been seen in the ER and was placed on antibiotics at that time. Staff reports that the procedure will be on 2020. She has had 13 teeth removed in the past and she may have to have the rest of her teeth extracted. Staff reports that she has been acting normal, has been eating well, and moving her bowels. They note no acute issues with her.       Review of Systems   Unable to perform ROS: Patient nonverbal         Current Outpatient Medications:     levocetirizine (XYZAL) 5 MG tablet, Take 1/2 tab po qd, Disp: 16 tablet, Rfl: 5    levETIRAcetam (KEPPRA) 100 MG/ML solution, 7.5ml twice daily, Disp: 1 Bottle, Rfl: 11    bismuth subsalicylate (BISMATROL) 262 MG/15ML suspension, Take 15 mLs by mouth every 6 hours as needed for Indigestion, Disp: , Rfl:     medroxyPROGESTERone (DEPO-PROVERA) 150 MG/ML injection, Inject 150 mg into the muscle every 3 months, Disp: , Rfl:     hydrOXYzine (ATARAX) 25 MG tablet, Take 25 mg by mouth daily, Disp: , Rfl:     cetirizine (ZYRTEC) 1 MG/ML syrup, Take by mouth daily 1-2 teaspoonful at bedtime, Disp: , Rfl:     polyethylene glycol (GLYCOLAX) powder, Take by mouth daily Pt takes 1 tablespoon 3 times a week --- at 7am, Disp: , Rfl:     acetaminophen (TYLENOL) 160 MG/5ML suspension, Take 10.16 mLs by mouth every 4 hours as needed for Fever (greater than 100.6), Disp: 240 mL, Rfl: 3    ibuprofen (CHILDRENS ADVIL) 100 MG/5ML suspension, Take 10 mLs by mouth every 6 hours as needed for Pain, Disp: 1 Bottle, Rfl: 3  Allergies   Allergen Reactions    No Known Allergies        Past Medical History:   Diagnosis Date    Allergic rhinitis  Cerebral palsy (HCC)     Decreased vision     Eczema     Hemiparesis (HCC)     Mental disability     Nystagmus     Seizures ([facility] [address]      History reviewed. No pertinent surgical history. History reviewed. No pertinent family history. Social History     Socioeconomic History    Marital status: Single     Spouse name: Not on file    Number of children: Not on file    Years of education: Not on file    Highest education level: Not on file   Occupational History    Not on file   Social Needs    Financial resource strain: Not on file    Food insecurity     Worry: Not on file     Inability: Not on file    Transportation needs     Medical: Not on file     Non-medical: Not on file   Tobacco Use    Smoking status: Never Smoker    Smokeless tobacco: Never Used   Substance and Sexual Activity    Alcohol use: No    Drug use: No    Sexual activity: Never   Lifestyle    Physical activity     Days per week: Not on file     Minutes per session: Not on file    Stress: Not on file   Relationships    Social connections     Talks on phone: Not on file     Gets together: Not on file     Attends Confucianism service: Not on file     Active member of club or organization: Not on file     Attends meetings of clubs or organizations: Not on file     Relationship status: Not on file    Intimate partner violence     Fear of current or ex partner: Not on file     Emotionally abused: Not on file     Physically abused: Not on file     Forced sexual activity: Not on file   Other Topics Concern    Not on file   Social History Narrative    Not on file       Vitals:    09/23/20 0944   BP: 118/72   Pulse: 76   Temp: 97.5 °F (36.4 °C)   TempSrc: Temporal   SpO2: 99%       Physical Exam  Vitals signs and nursing note reviewed. Constitutional:       General: She is awake. She is not in acute distress. Appearance: Normal appearance. She is well-developed. She is not ill-appearing, toxic-appearing or diaphoretic.    HENT: No hernia is present. Musculoskeletal: Normal range of motion. General: No swelling, tenderness or signs of injury. Right hip: She exhibits deformity (Poorly developed and contracted). Left hip: She exhibits deformity (Poorly developed and contracted). Right lower leg: No edema. Left lower leg: No edema. Lymphadenopathy:      Cervical: No cervical adenopathy. Skin:     General: Skin is warm and dry. Capillary Refill: Capillary refill takes less than 2 seconds. Coloration: Skin is not jaundiced or pale. Findings: No bruising, erythema, lesion or rash. Neurological:      General: No focal deficit present. Mental Status: She is alert. Mental status is at baseline. Cranial Nerves: No cranial nerve deficit. Sensory: Sensation is intact. No sensory deficit. Motor: Motor function is intact. No weakness. Coordination: Coordination is intact. Coordination normal.      Gait: Gait abnormal (Uses wheelchair). Deep Tendon Reflexes: Reflexes normal.   Psychiatric:         Attention and Perception: Attention and perception normal.         Mood and Affect: Mood and affect normal.         Speech: She is noncommunicative. Behavior: Behavior normal. Behavior is cooperative. Assessment and Plan:  Jean Parson was seen today for other. Diagnoses and all orders for this visit:    Dental caries  Comments:  Having dental extractions on 09/25/2020    Spastic hemiplegic cerebral palsy (HCC)    Seizures (HCC)    Mental disability    Decreased vision    Surgical clearance form was signed. Return in about 4 weeks (around 10/21/2020) for Review of chronic conditions. I spent 15 minutes face-to-face with this patient.       Seen By:  Sherryle Maryland, APRN - NP

## 2020-09-24 LAB
SARS-COV-2: NOT DETECTED
SOURCE: NORMAL

## 2020-10-15 NOTE — PROGRESS NOTES
Spoke to Arliss Hodgkins at facility no change in pt health  Gave new date / time of surgery  Now 10-23 arrive 0530

## 2020-10-15 NOTE — PROGRESS NOTES
Patient having covid testing at [facility] on 10/19/20. Patient asked to bring ID and to self quarantine until day of procedure. [facility] notifed of covid testing date.

## 2020-10-16 RX ORDER — SODIUM CHLORIDE, SODIUM LACTATE, POTASSIUM CHLORIDE, CALCIUM CHLORIDE 600; 310; 30; 20 MG/100ML; MG/100ML; MG/100ML; MG/100ML
INJECTION, SOLUTION INTRAVENOUS CONTINUOUS
Status: CANCELLED | OUTPATIENT
Start: 2020-10-16

## 2020-10-16 RX ORDER — SODIUM CHLORIDE 0.9 % (FLUSH) 0.9 %
10 SYRINGE (ML) INJECTION EVERY 12 HOURS SCHEDULED
Status: CANCELLED | OUTPATIENT
Start: 2020-10-16

## 2020-10-16 RX ORDER — SODIUM CHLORIDE 0.9 % (FLUSH) 0.9 %
10 SYRINGE (ML) INJECTION PRN
Status: CANCELLED | OUTPATIENT
Start: 2020-10-16

## 2020-10-19 ENCOUNTER — HOSPITAL ENCOUNTER (OUTPATIENT)
Age: 30
Discharge: HOME | End: 2020-10-21
Payer: MEDICARE

## 2020-10-19 PROCEDURE — U0003 INFECTIOUS AGENT DETECTION BY NUCLEIC ACID (DNA OR RNA); SEVERE ACUTE RESPIRATORY SYNDROME CORONAVIRUS 2 (SARS-COV-2) (CORONAVIRUS DISEASE [COVID-19]), AMPLIFIED PROBE TECHNIQUE, MAKING USE OF HIGH THROUGHPUT TECHNOLOGIES AS DESCRIBED BY CMS-2020-01-R: HCPCS

## 2020-10-21 LAB
SARS-COV-2: NOT DETECTED
SOURCE: NORMAL

## 2020-10-22 ENCOUNTER — ANESTHESIA EVENT (OUTPATIENT)
Dept: OPERATING ROOM | Age: 30
End: 2020-10-22
Payer: MEDICARE

## 2020-10-23 ENCOUNTER — ANESTHESIA (OUTPATIENT)
Dept: OPERATING ROOM | Age: 30
End: 2020-10-23
Payer: MEDICARE

## 2020-10-23 ENCOUNTER — HOSPITAL ENCOUNTER (OUTPATIENT)
Age: 30
Setting detail: OUTPATIENT SURGERY
Discharge: HOME | End: 2020-10-23
Attending: DENTIST | Admitting: DENTIST
Payer: MEDICARE

## 2020-10-23 VITALS
SYSTOLIC BLOOD PRESSURE: 102 MMHG | TEMPERATURE: 97.5 F | WEIGHT: 85 LBS | BODY MASS INDEX: 19.67 KG/M2 | OXYGEN SATURATION: 97 % | HEART RATE: 88 BPM | RESPIRATION RATE: 20 BRPM | DIASTOLIC BLOOD PRESSURE: 64 MMHG | HEIGHT: 55 IN

## 2020-10-23 VITALS
RESPIRATION RATE: 13 BRPM | OXYGEN SATURATION: 100 % | DIASTOLIC BLOOD PRESSURE: 56 MMHG | TEMPERATURE: 96.6 F | SYSTOLIC BLOOD PRESSURE: 110 MMHG

## 2020-10-23 PROBLEM — K05.30 PERIODONTITIS: Chronic | Status: ACTIVE | Noted: 2020-10-23

## 2020-10-23 PROBLEM — K04.7 DENTAL ABSCESS: Chronic | Status: ACTIVE | Noted: 2020-10-23

## 2020-10-23 LAB — HCG QUALITATIVE: NEGATIVE

## 2020-10-23 PROCEDURE — 7100000000 HC PACU RECOVERY - FIRST 15 MIN: Performed by: DENTIST

## 2020-10-23 PROCEDURE — 3700000001 HC ADD 15 MINUTES (ANESTHESIA): Performed by: DENTIST

## 2020-10-23 PROCEDURE — 6370000000 HC RX 637 (ALT 250 FOR IP): Performed by: DENTIST

## 2020-10-23 PROCEDURE — 3700000000 HC ANESTHESIA ATTENDED CARE: Performed by: DENTIST

## 2020-10-23 PROCEDURE — 36415 COLL VENOUS BLD VENIPUNCTURE: CPT

## 2020-10-23 PROCEDURE — 7100000001 HC PACU RECOVERY - ADDTL 15 MIN: Performed by: DENTIST

## 2020-10-23 PROCEDURE — 6360000002 HC RX W HCPCS: Performed by: ANESTHESIOLOGIST ASSISTANT

## 2020-10-23 PROCEDURE — 2580000003 HC RX 258: Performed by: STUDENT IN AN ORGANIZED HEALTH CARE EDUCATION/TRAINING PROGRAM

## 2020-10-23 PROCEDURE — 7100000010 HC PHASE II RECOVERY - FIRST 15 MIN: Performed by: DENTIST

## 2020-10-23 PROCEDURE — 3600000002 HC SURGERY LEVEL 2 BASE: Performed by: DENTIST

## 2020-10-23 PROCEDURE — 7100000011 HC PHASE II RECOVERY - ADDTL 15 MIN: Performed by: DENTIST

## 2020-10-23 PROCEDURE — 3600000012 HC SURGERY LEVEL 2 ADDTL 15MIN: Performed by: DENTIST

## 2020-10-23 PROCEDURE — 84703 CHORIONIC GONADOTROPIN ASSAY: CPT

## 2020-10-23 PROCEDURE — 2500000003 HC RX 250 WO HCPCS: Performed by: ANESTHESIOLOGIST ASSISTANT

## 2020-10-23 RX ORDER — PROPOFOL 10 MG/ML
INJECTION, EMULSION INTRAVENOUS PRN
Status: DISCONTINUED | OUTPATIENT
Start: 2020-10-23 | End: 2020-10-23 | Stop reason: SDUPTHER

## 2020-10-23 RX ORDER — LIDOCAINE HYDROCHLORIDE 20 MG/ML
INJECTION, SOLUTION INTRAVENOUS PRN
Status: DISCONTINUED | OUTPATIENT
Start: 2020-10-23 | End: 2020-10-23 | Stop reason: SDUPTHER

## 2020-10-23 RX ORDER — MORPHINE SULFATE 2 MG/ML
2 INJECTION, SOLUTION INTRAMUSCULAR; INTRAVENOUS EVERY 5 MIN PRN
Status: DISCONTINUED | OUTPATIENT
Start: 2020-10-23 | End: 2020-10-23 | Stop reason: HOSPADM

## 2020-10-23 RX ORDER — CEFAZOLIN SODIUM 1 G/3ML
INJECTION, POWDER, FOR SOLUTION INTRAMUSCULAR; INTRAVENOUS PRN
Status: DISCONTINUED | OUTPATIENT
Start: 2020-10-23 | End: 2020-10-23 | Stop reason: SDUPTHER

## 2020-10-23 RX ORDER — SODIUM CHLORIDE 0.9 % (FLUSH) 0.9 %
10 SYRINGE (ML) INJECTION PRN
Status: DISCONTINUED | OUTPATIENT
Start: 2020-10-23 | End: 2020-10-23 | Stop reason: SDUPTHER

## 2020-10-23 RX ORDER — SODIUM CHLORIDE 0.9 % (FLUSH) 0.9 %
10 SYRINGE (ML) INJECTION EVERY 12 HOURS SCHEDULED
Status: DISCONTINUED | OUTPATIENT
Start: 2020-10-23 | End: 2020-10-23 | Stop reason: HOSPADM

## 2020-10-23 RX ORDER — MORPHINE SULFATE 2 MG/ML
1 INJECTION, SOLUTION INTRAMUSCULAR; INTRAVENOUS EVERY 5 MIN PRN
Status: DISCONTINUED | OUTPATIENT
Start: 2020-10-23 | End: 2020-10-23 | Stop reason: HOSPADM

## 2020-10-23 RX ORDER — SODIUM CHLORIDE 0.9 % (FLUSH) 0.9 %
10 SYRINGE (ML) INJECTION EVERY 12 HOURS SCHEDULED
Status: DISCONTINUED | OUTPATIENT
Start: 2020-10-23 | End: 2020-10-23 | Stop reason: SDUPTHER

## 2020-10-23 RX ORDER — PROMETHAZINE HYDROCHLORIDE 25 MG/ML
6.25 INJECTION, SOLUTION INTRAMUSCULAR; INTRAVENOUS
Status: DISCONTINUED | OUTPATIENT
Start: 2020-10-23 | End: 2020-10-23 | Stop reason: HOSPADM

## 2020-10-23 RX ORDER — MIDAZOLAM HYDROCHLORIDE 1 MG/ML
INJECTION INTRAMUSCULAR; INTRAVENOUS PRN
Status: DISCONTINUED | OUTPATIENT
Start: 2020-10-23 | End: 2020-10-23 | Stop reason: SDUPTHER

## 2020-10-23 RX ORDER — ONDANSETRON 2 MG/ML
4 INJECTION INTRAMUSCULAR; INTRAVENOUS
Status: DISCONTINUED | OUTPATIENT
Start: 2020-10-23 | End: 2020-10-23 | Stop reason: HOSPADM

## 2020-10-23 RX ORDER — SODIUM CHLORIDE, SODIUM LACTATE, POTASSIUM CHLORIDE, CALCIUM CHLORIDE 600; 310; 30; 20 MG/100ML; MG/100ML; MG/100ML; MG/100ML
INJECTION, SOLUTION INTRAVENOUS CONTINUOUS
Status: DISCONTINUED | OUTPATIENT
Start: 2020-10-23 | End: 2020-10-23 | Stop reason: HOSPADM

## 2020-10-23 RX ORDER — SODIUM CHLORIDE, SODIUM LACTATE, POTASSIUM CHLORIDE, CALCIUM CHLORIDE 600; 310; 30; 20 MG/100ML; MG/100ML; MG/100ML; MG/100ML
INJECTION, SOLUTION INTRAVENOUS CONTINUOUS
Status: DISCONTINUED | OUTPATIENT
Start: 2020-10-23 | End: 2020-10-23 | Stop reason: SDUPTHER

## 2020-10-23 RX ORDER — FENTANYL CITRATE 50 UG/ML
INJECTION, SOLUTION INTRAMUSCULAR; INTRAVENOUS PRN
Status: DISCONTINUED | OUTPATIENT
Start: 2020-10-23 | End: 2020-10-23 | Stop reason: SDUPTHER

## 2020-10-23 RX ORDER — ROCURONIUM BROMIDE 10 MG/ML
INJECTION, SOLUTION INTRAVENOUS PRN
Status: DISCONTINUED | OUTPATIENT
Start: 2020-10-23 | End: 2020-10-23 | Stop reason: SDUPTHER

## 2020-10-23 RX ORDER — ONDANSETRON 2 MG/ML
INJECTION INTRAMUSCULAR; INTRAVENOUS PRN
Status: DISCONTINUED | OUTPATIENT
Start: 2020-10-23 | End: 2020-10-23 | Stop reason: SDUPTHER

## 2020-10-23 RX ORDER — PHENYLEPHRINE HCL IN 0.9% NACL 1 MG/10 ML
SYRINGE (ML) INTRAVENOUS PRN
Status: DISCONTINUED | OUTPATIENT
Start: 2020-10-23 | End: 2020-10-23 | Stop reason: SDUPTHER

## 2020-10-23 RX ORDER — SODIUM CHLORIDE 0.9 % (FLUSH) 0.9 %
10 SYRINGE (ML) INJECTION PRN
Status: DISCONTINUED | OUTPATIENT
Start: 2020-10-23 | End: 2020-10-23 | Stop reason: HOSPADM

## 2020-10-23 RX ORDER — DEXAMETHASONE SODIUM PHOSPHATE 10 MG/ML
INJECTION, SOLUTION INTRAMUSCULAR; INTRAVENOUS PRN
Status: DISCONTINUED | OUTPATIENT
Start: 2020-10-23 | End: 2020-10-23 | Stop reason: SDUPTHER

## 2020-10-23 RX ADMIN — SODIUM CHLORIDE, POTASSIUM CHLORIDE, SODIUM LACTATE AND CALCIUM CHLORIDE: 600; 310; 30; 20 INJECTION, SOLUTION INTRAVENOUS at 06:46

## 2020-10-23 RX ADMIN — Medication 50 MCG: at 09:07

## 2020-10-23 RX ADMIN — FENTANYL CITRATE 25 MCG: 50 INJECTION, SOLUTION INTRAMUSCULAR; INTRAVENOUS at 08:51

## 2020-10-23 RX ADMIN — DEXAMETHASONE SODIUM PHOSPHATE 8 MG: 10 INJECTION, SOLUTION INTRAMUSCULAR; INTRAVENOUS at 07:54

## 2020-10-23 RX ADMIN — LIDOCAINE HYDROCHLORIDE 40 MG: 20 INJECTION, SOLUTION INTRAVENOUS at 07:31

## 2020-10-23 RX ADMIN — CEFAZOLIN 1000 MG: 1 INJECTION, POWDER, FOR SOLUTION INTRAMUSCULAR; INTRAVENOUS at 07:58

## 2020-10-23 RX ADMIN — PROPOFOL 30 MG: 10 INJECTION, EMULSION INTRAVENOUS at 08:40

## 2020-10-23 RX ADMIN — FENTANYL CITRATE 50 MCG: 50 INJECTION, SOLUTION INTRAMUSCULAR; INTRAVENOUS at 08:10

## 2020-10-23 RX ADMIN — ROCURONIUM BROMIDE 15 MG: 10 INJECTION, SOLUTION INTRAVENOUS at 07:32

## 2020-10-23 RX ADMIN — MIDAZOLAM 1 MG: 1 INJECTION INTRAMUSCULAR; INTRAVENOUS at 07:25

## 2020-10-23 RX ADMIN — ONDANSETRON HYDROCHLORIDE 3.8 MG: 2 INJECTION, SOLUTION INTRAMUSCULAR; INTRAVENOUS at 09:01

## 2020-10-23 RX ADMIN — FENTANYL CITRATE 50 MCG: 50 INJECTION, SOLUTION INTRAMUSCULAR; INTRAVENOUS at 07:31

## 2020-10-23 RX ADMIN — MIDAZOLAM 1 MG: 1 INJECTION INTRAMUSCULAR; INTRAVENOUS at 07:29

## 2020-10-23 RX ADMIN — PROPOFOL 80 MG: 10 INJECTION, EMULSION INTRAVENOUS at 07:31

## 2020-10-23 ASSESSMENT — PULMONARY FUNCTION TESTS
PIF_VALUE: 2
PIF_VALUE: 13
PIF_VALUE: 0
PIF_VALUE: 12
PIF_VALUE: 14
PIF_VALUE: 12
PIF_VALUE: 14
PIF_VALUE: 13
PIF_VALUE: 14
PIF_VALUE: 16
PIF_VALUE: 15
PIF_VALUE: 3
PIF_VALUE: 14
PIF_VALUE: 13
PIF_VALUE: 12
PIF_VALUE: 1
PIF_VALUE: 14
PIF_VALUE: 14
PIF_VALUE: 13
PIF_VALUE: 12
PIF_VALUE: 14
PIF_VALUE: 12
PIF_VALUE: 14
PIF_VALUE: 15
PIF_VALUE: 14
PIF_VALUE: 14
PIF_VALUE: 13
PIF_VALUE: 14
PIF_VALUE: 13
PIF_VALUE: 14
PIF_VALUE: 12
PIF_VALUE: 15
PIF_VALUE: 1
PIF_VALUE: 14
PIF_VALUE: 13
PIF_VALUE: 14
PIF_VALUE: 13
PIF_VALUE: 14
PIF_VALUE: 13
PIF_VALUE: 14
PIF_VALUE: 14
PIF_VALUE: 13
PIF_VALUE: 8
PIF_VALUE: 14
PIF_VALUE: 13
PIF_VALUE: 13
PIF_VALUE: 14
PIF_VALUE: 15
PIF_VALUE: 12
PIF_VALUE: 14
PIF_VALUE: 6
PIF_VALUE: 13
PIF_VALUE: 2
PIF_VALUE: 13
PIF_VALUE: 14
PIF_VALUE: 14
PIF_VALUE: 9
PIF_VALUE: 13
PIF_VALUE: 13
PIF_VALUE: 8
PIF_VALUE: 15
PIF_VALUE: 2
PIF_VALUE: 14
PIF_VALUE: 8
PIF_VALUE: 15
PIF_VALUE: 14
PIF_VALUE: 13
PIF_VALUE: 13
PIF_VALUE: 14
PIF_VALUE: 1
PIF_VALUE: 13
PIF_VALUE: 14
PIF_VALUE: 14
PIF_VALUE: 12
PIF_VALUE: 14
PIF_VALUE: 14
PIF_VALUE: 15
PIF_VALUE: 15
PIF_VALUE: 6
PIF_VALUE: 13
PIF_VALUE: 13
PIF_VALUE: 14
PIF_VALUE: 14
PIF_VALUE: 15
PIF_VALUE: 12
PIF_VALUE: 13
PIF_VALUE: 14
PIF_VALUE: 1
PIF_VALUE: 14
PIF_VALUE: 15
PIF_VALUE: 13
PIF_VALUE: 14
PIF_VALUE: 11
PIF_VALUE: 14
PIF_VALUE: 0

## 2020-10-23 ASSESSMENT — PAIN SCALES - GENERAL
PAINLEVEL_OUTOF10: 0

## 2020-10-23 ASSESSMENT — PAIN - FUNCTIONAL ASSESSMENT: PAIN_FUNCTIONAL_ASSESSMENT: 0-10

## 2020-10-23 NOTE — ANESTHESIA POSTPROCEDURE EVALUATION
Department of Anesthesiology  Postprocedure Note    Patient: Jaqueline Bradshaw  MRN: [id number]  YOB: 1990  Date of evaluation: 10/23/2020  Time:  2:20 PM     Procedure Summary     Date:  10/23/20 Room / Location:  [facility] OR 10 / [facility]    Anesthesia Start:  [place] Anesthesia Stop:  6816    Procedure:  DENTAL RESTORATIONS-----FACILITY----- (N/A Mouth) Diagnosis:  (DENTAL CARIES)    Surgeon:  Ysabel Stephens DDS Responsible Provider:  Esther Lin DO    Anesthesia Type:  general ASA Status:  3          Anesthesia Type: general    Cordell Phase I: Cordell Score: 10    Cordell Phase II: Cordell Score: 10    Last vitals: Reviewed and per EMR flowsheets.        Anesthesia Post Evaluation    Patient location during evaluation: PACU  Patient participation: complete - patient participated  Level of consciousness: awake and alert  Pain score: 1  Airway patency: patent  Nausea & Vomiting: no nausea and no vomiting  Complications: no  Cardiovascular status: hemodynamically stable  Respiratory status: acceptable  Hydration status: euvolemic
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
## 2020-10-23 NOTE — BRIEF OP NOTE
Brief Postoperative Note       Patient: Gagan Galo  YOB: 1990  MRN: [id number]    Date of Procedure: 10/23/2020    Pre-Op Diagnosis: DENTAL CARIES    Post-Op Diagnosis: Acute apical abscess , Dental Caries        Dental Procedure: Extractions x 14     Surgeon(s):  DEVAN Chang DDS Mikey Africa, DDS     Assistant:  DIANA Amaya       Anesthesia: General, ETT     Estimated Blood Loss (mL): < 15 mL     Complications: None    Specimens:   * No specimens in log *    Implants:  * No implants in log *      Drains: * No LDAs found *    Findings: Multiple Acute Abscesses, Generalized caries, Generalized severe chronic periodontitis, Generalized attrition present     Electronically signed by Navya Grant DDS on 10/23/2020 at 9:25 AM   significant

## 2020-10-23 NOTE — OP NOTE
Date of Procedure: 10/23/2020     Surgeon: Roberto Barriga DDS      Surgical Wound Classification: Clean Contaminated II    Preoperative Diagnosis: Dental Caries and Dental Abscess       Postoperative Diagnosis: Dental Caries, Dental Abscess, Generalized Severe Chronic Periodontitis    Operation: Xrays, Exam, Dental Extractions x14    Anesthesia: General, ETT     Estimated Blood Loss: <75XI     Complications:  None     Fluids: 600  mL    Specimen: none    Conditions:  good    Disposition: To PACU, stable    Procedure: This patient was initially seen in the preoperative holding area, where the history, physical and consents were updated and verified. The patient was then transferred via the anesthesia team and [facility] to operating room # 10 at [address] [place] [address] on 10/23/2020 , at which time the patient was transferred from the [facility] onto the operating room table and placed in the supine position. The patient's arms and legs were padded at the sides. The patient had the general anesthetic monitors applied. Please see anesthesia notes for complete details. Once the patient was placed into a general anesthetic state, the surgical area was prepped and draped in a standard oral and maxillofacial surgery fashion. 1 gram of Ancef was administered prior to procedure. A throat pack was placed. A comprehensive oral exam was performed. 16 radiographs were taken. A treatment plan was formulated based upon presenting clinical and radiographic findings. Generalized caries were identified throughout dentition. Acute apical abscesses were found on teeth #10, #21, and #22. Upon examination via perio charting, determined that patient has generalized chronic severe periodontitis. Pt had extensive bone loss around every tooth. Treatment plan was determined by perio status and active carious infections. Determined that full mouth extractions is best course of treatment. Surgical procedure was initiated. Using 1.5 length 27-gauge needle, and 3.4 mL of 2% lidocaine with 1:100,000 epinephrine, local anesthesia was achieved. Utilizing proper surgical instruments and techniques, the following teeth were removed: #3, #4, #10, #11, #12, #13, #14, #15, #21, #22, #27, #28, #29, #31. Teeth were removed due to caries supported by clinical and radiographic evidence. All teeth removed were non-restorable. Extraction sites were copiously irrigated with normal saline, and felt to be smooth by finger touch. Extractions sites were closed with 3.0 chromic gut on a tapered PS-2 needle. Hemostasis achieved. One final round or copious irrigation with suction was completed. Throat pack was removed. Patient was awake from anesthesia. Patient was released to recovery room in good condition. Patient tolerated procedure well. Postoperative instructions given to caregiver. The following prescriptions were given: (1) ibuprofen (2) acetaminophen (both were liquid form and were called into [place]'s pharmacy - see progress note). No refills on either prescription. 1-Week Post Op:  10/23/2020 at  1:00 pm     Written by: Erin Nieves D.D.S. for Rohan Geronimo DDS     I was present with the above resident and patient for pre-operative, procedure, and post-operative care. I agree with the above. Proper consents obtained from guardian prior to procedure. The patient's caregiver was updated throughout. Written and verbal post-op instructions given to patient's caregiver who will be with patient throughout the weekend.    Electronically signed by Rohan Geronimo DDS on 10/23/2020 at 1:10 PM

## 2020-10-30 RX ORDER — HYDROXYZINE HYDROCHLORIDE 25 MG/1
25 TABLET, FILM COATED ORAL DAILY
Qty: 31 TABLET | Refills: 5 | Status: SHIPPED
Start: 2020-10-30 | End: 2020-11-16 | Stop reason: SDUPTHER

## 2020-11-12 RX ORDER — MEDROXYPROGESTERONE ACETATE 150 MG/ML
150 INJECTION, SUSPENSION INTRAMUSCULAR
Qty: 1 ML | Refills: 5 | Status: SHIPPED
Start: 2020-11-12 | End: 2020-11-16 | Stop reason: SDUPTHER

## 2020-11-16 ENCOUNTER — OFFICE VISIT (OUTPATIENT)
Dept: PRIMARY CARE CLINIC | Age: 30
End: 2020-11-16
Payer: MEDICARE

## 2020-11-16 VITALS
TEMPERATURE: 97.3 F | HEART RATE: 64 BPM | BODY MASS INDEX: 23.95 KG/M2 | WEIGHT: 78.5 LBS | OXYGEN SATURATION: 94 % | SYSTOLIC BLOOD PRESSURE: 108 MMHG | DIASTOLIC BLOOD PRESSURE: 68 MMHG

## 2020-11-16 PROCEDURE — 99214 OFFICE O/P EST MOD 30 MIN: CPT | Performed by: NURSE PRACTITIONER

## 2020-11-16 PROCEDURE — 90688 IIV4 VACCINE SPLT 0.5 ML IM: CPT | Performed by: NURSE PRACTITIONER

## 2020-11-16 PROCEDURE — G0008 ADMIN INFLUENZA VIRUS VAC: HCPCS | Performed by: NURSE PRACTITIONER

## 2020-11-16 RX ORDER — POLYETHYLENE GLYCOL 3350 17 G/17G
17 POWDER, FOR SOLUTION ORAL DAILY
Qty: 1 BOTTLE | Refills: 3 | Status: SHIPPED
Start: 2020-11-16 | End: 2021-06-28

## 2020-11-16 RX ORDER — MEDROXYPROGESTERONE ACETATE 150 MG/ML
150 INJECTION, SUSPENSION INTRAMUSCULAR
Qty: 1 ML | Refills: 5 | Status: SHIPPED
Start: 2020-11-16 | End: 2021-11-10

## 2020-11-16 RX ORDER — LEVETIRACETAM 100 MG/ML
SOLUTION ORAL
Qty: 1 BOTTLE | Refills: 11 | Status: SHIPPED
Start: 2020-11-16 | End: 2021-08-25

## 2020-11-16 RX ORDER — HYDROXYZINE HYDROCHLORIDE 25 MG/1
25 TABLET, FILM COATED ORAL DAILY
Qty: 31 TABLET | Refills: 5 | Status: SHIPPED
Start: 2020-11-16 | End: 2021-10-26

## 2020-11-16 RX ORDER — LEVOCETIRIZINE DIHYDROCHLORIDE 5 MG/1
TABLET, FILM COATED ORAL
Qty: 16 TABLET | Refills: 5 | Status: SHIPPED
Start: 2020-11-16 | End: 2021-02-25

## 2020-11-16 NOTE — PATIENT INSTRUCTIONS
Patient Education        Allergies: Care Instructions  Your Care Instructions     Allergies occur when your body's defense system (immune system) overreacts to certain substances. The immune system treats a harmless substance as if it were a harmful germ or virus. Many things can make this happen. These include pollens, medicine, food, dust, animal dander, and mold. Allergies can be mild or severe. Mild allergies can be managed with home treatment. But medicine may be needed to prevent problems. Managing your allergies is an important part of staying healthy. Your doctor may suggest that you have allergy testing to help find out what is causing your allergies. Severe allergies can cause reactions that affect your whole body (anaphylactic reactions). Your doctor may prescribe a shot of epinephrine to carry with you in case you have a severe reaction. Learn how to give yourself the shot and keep it with you at all times. Make sure it is not . Follow-up care is a key part of your treatment and safety. Be sure to make and go to all appointments, and call your doctor if you are having problems. It's also a good idea to know your test results and keep a list of the medicines you take. How can you care for yourself at home? · If you have been told by your doctor that dust or dust mites are causing your allergy, decrease the dust around your bed:  ? Wash sheets, pillowcases, and other bedding in hot water every week. ? Use dust-proof covers for pillows, duvets, and mattresses. Avoid plastic covers because they tear easily and do not \"breathe. \" Wash as instructed on the label. ? Do not use any blankets and pillows that you do not need. ? Use blankets that you can wash in your washing machine. ? Consider removing drapes and carpets, which attract and hold dust, from your bedroom. · If you are allergic to house dust and mites, do not use home humidifiers.  Your doctor can suggest ways you can control dust and mites. · Look for signs of cockroaches. Cockroaches cause allergic reactions. Use cockroach baits to get rid of them. Then, clean your home well. Cockroaches like areas where grocery bags, newspapers, empty bottles, or cardboard boxes are stored. Do not keep these inside your home, and keep trash and food containers sealed. Seal off any spots where cockroaches might enter your home. · If you are allergic to mold, get rid of furniture, rugs, and drapes that smell musty. Check for mold in the bathroom. · If you are allergic to outdoor pollen or mold spores, use air-conditioning. Change or clean all filters every month. Keep windows closed. · If you are allergic to pollen, stay inside when pollen counts are high. Use a vacuum  with a HEPA filter or a double-thickness filter at least two times each week. · Stay inside when air pollution is bad. Avoid paint fumes, perfumes, and other strong odors. · Avoid conditions that make your allergies worse. Stay away from smoke. Do not smoke or let anyone else smoke in your house. Do not use fireplaces or wood-burning stoves. · If you are allergic to your pets, change the air filter in your furnace every month. Use high-efficiency filters. · If you are allergic to pet dander, keep pets outside or out of your bedroom. Old carpet and cloth furniture can hold a lot of animal dander. You may need to replace them. When should you call for help? Give an epinephrine shot if:    · You think you are having a severe allergic reaction.     · You have symptoms in more than one body area, such as mild nausea and an itchy mouth. After giving an epinephrine shot call 911, even if you feel better. Call 911 if:    · You have symptoms of a severe allergic reaction. These may include:  ? Sudden raised, red areas (hives) all over your body. ? Swelling of the throat, mouth, lips, or tongue. ? Trouble breathing. ? Passing out (losing consciousness).  Or you may feel very lightheaded or suddenly feel weak, confused, or restless.     · You have been given an epinephrine shot, even if you feel better. Call your doctor now or seek immediate medical care if:    · You have symptoms of an allergic reaction, such as:  ? A rash or hives (raised, red areas on the skin). ? Itching. ? Swelling. ? Belly pain, nausea, or vomiting. Watch closely for changes in your health, and be sure to contact your doctor if:    · You do not get better as expected. Where can you learn more? Go to https://WiN MSpeCoubic.GetJar. org and sign in to your Realitycheck account. Enter C610 in the Beep box to learn more about \"Allergies: Care Instructions. \"     If you do not have an account, please click on the \"Sign Up Now\" link. Current as of: June 29, 2020               Content Version: 12.6  © 2095-4810 [employer]. Care instructions adapted under license by [facility] ([facility]). If you have questions about a medical condition or this instruction, always ask your healthcare professional. [place] [address] any warranty or liability for your use of this information.
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
## 2021-03-22 ENCOUNTER — TELEPHONE (OUTPATIENT)
Dept: PRIMARY CARE CLINIC | Age: 31
End: 2021-03-22

## 2021-03-22 RX ORDER — FLUTICASONE PROPIONATE 50 MCG
2 SPRAY, SUSPENSION (ML) NASAL DAILY
Qty: 1 BOTTLE | Refills: 5 | Status: SHIPPED
Start: 2021-03-22 | End: 2022-01-14 | Stop reason: SDUPTHER

## 2021-03-22 NOTE — TELEPHONE ENCOUNTER
Last Appointment:  11/16/2020  No future appointments. Mati Mcnallya of [facility] reports patient needs something else for allergies. Patient already on Xyzal but it is not helping with itchy eyes/runny nose. Please send to [facility].     Electronically signed by Amaury Foster LPN on 0/01/5089 at 6:93 AM

## 2021-06-02 RX ORDER — BISMUTH SUBSALICYLATE 525 MG/30ML
LIQUID ORAL
Qty: 236 ML | Refills: 5 | Status: SHIPPED
Start: 2021-06-02 | End: 2022-01-14 | Stop reason: SDUPTHER

## 2021-06-28 RX ORDER — POLYETHYLENE GLYCOL 3350 17 G/17G
POWDER ORAL
Qty: 175 G | Refills: 5 | Status: SHIPPED
Start: 2021-06-28 | End: 2022-01-14 | Stop reason: SDUPTHER

## 2021-07-09 ENCOUNTER — OFFICE VISIT (OUTPATIENT)
Dept: PRIMARY CARE CLINIC | Age: 31
End: 2021-07-09
Payer: MEDICARE

## 2021-07-09 VITALS
DIASTOLIC BLOOD PRESSURE: 68 MMHG | HEIGHT: 55 IN | BODY MASS INDEX: 18.17 KG/M2 | OXYGEN SATURATION: 96 % | SYSTOLIC BLOOD PRESSURE: 105 MMHG | HEART RATE: 119 BPM | TEMPERATURE: 97.5 F | WEIGHT: 78.5 LBS

## 2021-07-09 DIAGNOSIS — E55.9 VITAMIN D DEFICIENCY: ICD-10-CM

## 2021-07-09 DIAGNOSIS — Z79.899 ENCOUNTER FOR LONG-TERM CURRENT USE OF MEDICATION: ICD-10-CM

## 2021-07-09 DIAGNOSIS — Z13.220 SCREENING FOR LIPID DISORDERS: ICD-10-CM

## 2021-07-09 DIAGNOSIS — F79 MENTAL DISABILITY: ICD-10-CM

## 2021-07-09 DIAGNOSIS — E43 UNSPECIFIED SEVERE PROTEIN-CALORIE MALNUTRITION (HCC): ICD-10-CM

## 2021-07-09 DIAGNOSIS — G80.2 SPASTIC HEMIPLEGIC CEREBRAL PALSY (HCC): ICD-10-CM

## 2021-07-09 DIAGNOSIS — R56.9 SEIZURES (HCC): Primary | ICD-10-CM

## 2021-07-09 DIAGNOSIS — H61.22 IMPACTED CERUMEN OF LEFT EAR: ICD-10-CM

## 2021-07-09 PROCEDURE — 1036F TOBACCO NON-USER: CPT | Performed by: NURSE PRACTITIONER

## 2021-07-09 PROCEDURE — G8427 DOCREV CUR MEDS BY ELIG CLIN: HCPCS | Performed by: NURSE PRACTITIONER

## 2021-07-09 PROCEDURE — 69210 REMOVE IMPACTED EAR WAX UNI: CPT | Performed by: NURSE PRACTITIONER

## 2021-07-09 PROCEDURE — 99214 OFFICE O/P EST MOD 30 MIN: CPT | Performed by: NURSE PRACTITIONER

## 2021-07-09 PROCEDURE — G8420 CALC BMI NORM PARAMETERS: HCPCS | Performed by: NURSE PRACTITIONER

## 2021-07-09 SDOH — ECONOMIC STABILITY: FOOD INSECURITY: WITHIN THE PAST 12 MONTHS, THE FOOD YOU BOUGHT JUST DIDN'T LAST AND YOU DIDN'T HAVE MONEY TO GET MORE.: NEVER TRUE

## 2021-07-09 SDOH — ECONOMIC STABILITY: FOOD INSECURITY: WITHIN THE PAST 12 MONTHS, YOU WORRIED THAT YOUR FOOD WOULD RUN OUT BEFORE YOU GOT MONEY TO BUY MORE.: NEVER TRUE

## 2021-07-09 ASSESSMENT — PATIENT HEALTH QUESTIONNAIRE - PHQ9
SUM OF ALL RESPONSES TO PHQ9 QUESTIONS 1 & 2: 0
SUM OF ALL RESPONSES TO PHQ QUESTIONS 1-9: 0
SUM OF ALL RESPONSES TO PHQ QUESTIONS 1-9: 0
2. FEELING DOWN, DEPRESSED OR HOPELESS: 0
1. LITTLE INTEREST OR PLEASURE IN DOING THINGS: 0
SUM OF ALL RESPONSES TO PHQ QUESTIONS 1-9: 0

## 2021-07-09 ASSESSMENT — ENCOUNTER SYMPTOMS
COLOR CHANGE: 0
ANAL BLEEDING: 0
RECTAL PAIN: 0
ABDOMINAL PAIN: 0
BLOOD IN STOOL: 0

## 2021-07-09 ASSESSMENT — SOCIAL DETERMINANTS OF HEALTH (SDOH): HOW HARD IS IT FOR YOU TO PAY FOR THE VERY BASICS LIKE FOOD, HOUSING, MEDICAL CARE, AND HEATING?: NOT HARD AT ALL
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
## 2021-07-09 NOTE — PATIENT INSTRUCTIONS
Patient Education        Earwax Blockage: Care Instructions  Your Care Instructions     Earwax is a natural substance that protects the ear canal. Normally, earwax drains from the ears and does not cause problems. Sometimes earwax builds up and hardens. Earwax blockage (also called cerumen impaction) can cause some loss of hearing and pain. When wax is tightly packed, you will need to have your doctor remove it. Follow-up care is a key part of your treatment and safety. Be sure to make and go to all appointments, and call your doctor if you are having problems. It's also a good idea to know your test results and keep a list of the medicines you take. How can you care for yourself at home? · Do not try to remove earwax with cotton swabs, fingers, or other objects. This can make the blockage worse and damage the eardrum. · If your doctor recommends that you try to remove earwax at home:  ? Soften and loosen the earwax with warm mineral oil. You also can try hydrogen peroxide mixed with an equal amount of room temperature water. Place 2 drops of the fluid, warmed to body temperature, in the ear two times a day for up to 5 days. ? Once the wax is loose and soft, all that is usually needed to remove it from the ear canal is a gentle, warm shower. Direct the water into the ear, then tip your head to let the earwax drain out. Dry your ear thoroughly with a hair dryer set on low. Hold the dryer several inches from your ear. ? If the warm mineral oil and shower do not work, use an over-the-counter wax softener. Read and follow all instructions on the label. After using the wax softener, use an ear syringe to gently flush the ear. Make sure the flushing solution is body temperature. Cool or hot fluids in the ear can cause dizziness. When should you call for help?    Call your doctor now or seek immediate medical care if:    · Pus or blood drains from your ear.     · Your ears are ringing or feel full.     · You have a loss of hearing. Watch closely for changes in your health, and be sure to contact your doctor if:    · You have pain or reduced hearing after 1 week of home treatment.     · You have any new symptoms, such as nausea or balance problems. Where can you learn more? Go to https://chpesorayaeb.[employer]. org and sign in to your TrustedAdt account. Enter M476 in the Balanced box to learn more about \"Earwax Blockage: Care Instructions. \"     If you do not have an account, please click on the \"Sign Up Now\" link. Current as of: October 19, 2020               Content Version: 12.9  © 2818-2585 [employer] Care instructions adapted under license by [facility] ([facility]). If you have questions about a medical condition or this instruction, always ask your healthcare professional. [address] any warranty or liability for your use of this information.

## 2021-08-25 DIAGNOSIS — G40.219 PARTIAL EPILEPSY WITH IMPAIRMENT OF CONSCIOUSNESS, INTRACTABLE, POORLY CONTROLLED (HCC): ICD-10-CM

## 2021-08-25 RX ORDER — LEVOCETIRIZINE DIHYDROCHLORIDE 5 MG/1
TABLET, FILM COATED ORAL
Qty: 16 TABLET | Refills: 5 | Status: SHIPPED
Start: 2021-08-25 | End: 2022-01-14 | Stop reason: SDUPTHER

## 2021-08-25 RX ORDER — LEVETIRACETAM 100 MG/ML
SOLUTION ORAL
Qty: 465 ML | Refills: 0 | Status: SHIPPED
Start: 2021-08-25 | End: 2021-09-23

## 2021-09-23 DIAGNOSIS — G40.219 PARTIAL EPILEPSY WITH IMPAIRMENT OF CONSCIOUSNESS, INTRACTABLE, POORLY CONTROLLED (HCC): ICD-10-CM

## 2021-09-23 RX ORDER — LEVETIRACETAM 100 MG/ML
SOLUTION ORAL
Qty: 465 ML | Refills: 0 | Status: SHIPPED
Start: 2021-09-23 | End: 2021-10-26

## 2021-09-29 ENCOUNTER — TELEPHONE (OUTPATIENT)
Dept: NEUROLOGY | Age: 31
End: 2021-09-29

## 2021-09-29 NOTE — TELEPHONE ENCOUNTER
Sobeida from [employer] called in asking if the quantity of Keppra on patient can be 473 instead of [phone] due to insurance.   Please Advise  Electronically signed by Miguel Velázquez on 9/29/21 at 7:53 AM EDT

## 2021-10-26 DIAGNOSIS — G40.219 PARTIAL EPILEPSY WITH IMPAIRMENT OF CONSCIOUSNESS, INTRACTABLE, POORLY CONTROLLED (HCC): ICD-10-CM

## 2021-10-26 RX ORDER — LEVETIRACETAM 100 MG/ML
SOLUTION ORAL
Qty: 465 ML | Refills: 0 | Status: SHIPPED
Start: 2021-10-26 | End: 2021-11-19

## 2021-10-26 RX ORDER — HYDROXYZINE HYDROCHLORIDE 25 MG/1
TABLET, FILM COATED ORAL
Qty: 31 TABLET | Refills: 5 | Status: SHIPPED
Start: 2021-10-26 | End: 2022-01-14 | Stop reason: SDUPTHER

## 2021-10-29 ENCOUNTER — OFFICE VISIT (OUTPATIENT)
Dept: PRIMARY CARE CLINIC | Age: 31
End: 2021-10-29
Payer: MEDICARE

## 2021-10-29 VITALS
TEMPERATURE: 97.9 F | HEART RATE: 132 BPM | DIASTOLIC BLOOD PRESSURE: 80 MMHG | OXYGEN SATURATION: 97 % | SYSTOLIC BLOOD PRESSURE: 128 MMHG

## 2021-10-29 DIAGNOSIS — G80.2 SPASTIC HEMIPLEGIC CEREBRAL PALSY (HCC): ICD-10-CM

## 2021-10-29 DIAGNOSIS — F79 MENTAL DISABILITY: ICD-10-CM

## 2021-10-29 DIAGNOSIS — Z79.899 ENCOUNTER FOR LONG-TERM CURRENT USE OF MEDICATION: ICD-10-CM

## 2021-10-29 DIAGNOSIS — K02.9 DENTAL CARIES: ICD-10-CM

## 2021-10-29 DIAGNOSIS — G81.91 HEMIPARESIS OF RIGHT DOMINANT SIDE DUE TO NON-CEREBROVASCULAR ETIOLOGY (HCC): ICD-10-CM

## 2021-10-29 DIAGNOSIS — R56.9 SEIZURES (HCC): Primary | ICD-10-CM

## 2021-10-29 DIAGNOSIS — J30.1 SEASONAL ALLERGIC RHINITIS DUE TO POLLEN: ICD-10-CM

## 2021-10-29 DIAGNOSIS — H54.7 DECREASED VISION: ICD-10-CM

## 2021-10-29 DIAGNOSIS — E55.9 VITAMIN D DEFICIENCY: ICD-10-CM

## 2021-10-29 DIAGNOSIS — H55.00 NYSTAGMUS: ICD-10-CM

## 2021-10-29 PROCEDURE — G8420 CALC BMI NORM PARAMETERS: HCPCS | Performed by: NURSE PRACTITIONER

## 2021-10-29 PROCEDURE — 90674 CCIIV4 VAC NO PRSV 0.5 ML IM: CPT | Performed by: NURSE PRACTITIONER

## 2021-10-29 PROCEDURE — G8482 FLU IMMUNIZE ORDER/ADMIN: HCPCS | Performed by: NURSE PRACTITIONER

## 2021-10-29 PROCEDURE — 1036F TOBACCO NON-USER: CPT | Performed by: NURSE PRACTITIONER

## 2021-10-29 PROCEDURE — G0008 ADMIN INFLUENZA VIRUS VAC: HCPCS | Performed by: NURSE PRACTITIONER

## 2021-10-29 PROCEDURE — G8427 DOCREV CUR MEDS BY ELIG CLIN: HCPCS | Performed by: NURSE PRACTITIONER

## 2021-10-29 PROCEDURE — 99214 OFFICE O/P EST MOD 30 MIN: CPT | Performed by: NURSE PRACTITIONER

## 2021-10-29 ASSESSMENT — ENCOUNTER SYMPTOMS
COLOR CHANGE: 0
ANAL BLEEDING: 0
BLOOD IN STOOL: 0
RECTAL PAIN: 0
ABDOMINAL PAIN: 0
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
## 2021-10-29 NOTE — PATIENT INSTRUCTIONS
Patient Education        Allergies: Care Instructions  Overview     Allergies occur when your body's defense system (immune system) overreacts to certain substances. The immune system treats a harmless substance as if it were a harmful germ or virus. Many things can make this happen. These include pollens, medicine, food, dust, animal dander, and mold. Allergies can be mild or severe. Mild allergies can be managed with home treatment. But medicine may be needed to prevent problems. Managing your allergies is an important part of staying healthy. Your doctor may suggest that you have allergy testing to help find out what is causing your allergies. Severe allergies can cause reactions that affect your whole body (anaphylactic reactions). Your doctor may prescribe a shot of epinephrine to carry with you in case you have a severe reaction. Learn how to give yourself the shot and keep it with you at all times. Make sure it is not . Follow-up care is a key part of your treatment and safety. Be sure to make and go to all appointments, and call your doctor if you are having problems. It's also a good idea to know your test results and keep a list of the medicines you take. How can you care for yourself at home? · If you have been told by your doctor that dust or dust mites are causing your allergy, decrease the dust around your bed:  ? Wash sheets, pillowcases, and other bedding in hot water every week. ? Use dust-proof covers for pillows, duvets, and mattresses. Avoid plastic covers because they tear easily and do not \"breathe. \" Wash as instructed on the label. ? Do not use any blankets and pillows that you do not need. ? Use blankets that you can wash in your washing machine. ? Consider removing drapes and carpets, which attract and hold dust, from your bedroom. · If you are allergic to house dust and mites, do not use home humidifiers.  Your doctor can suggest ways you can control dust and mites.  · Look for signs of cockroaches. Cockroaches cause allergic reactions. Use cockroach baits to get rid of them. Then, clean your home well. Cockroaches like areas where grocery bags, newspapers, empty bottles, or cardboard boxes are stored. Do not keep these inside your home, and keep trash and food containers sealed. Seal off any spots where cockroaches might enter your home. · If you are allergic to mold, get rid of furniture, rugs, and drapes that smell musty. Check for mold in the bathroom. · If you are allergic to outdoor pollen or mold spores, use air-conditioning. Change or clean all filters every month. Keep windows closed. · If you are allergic to pollen, stay inside when pollen counts are high. Use a vacuum  with a HEPA filter or a double-thickness filter at least two times each week. · Stay inside when air pollution is bad. Avoid paint fumes, perfumes, and other strong odors. · Avoid conditions that make your allergies worse. Stay away from smoke. Do not smoke or let anyone else smoke in your house. Do not use fireplaces or wood-burning stoves. · If you are allergic to your pets, change the air filter in your furnace every month. Use high-efficiency filters. · If you are allergic to pet dander, keep pets outside or out of your bedroom. Old carpet and cloth furniture can hold a lot of animal dander. You may need to replace them. When should you call for help? Give an epinephrine shot if:    · You think you are having a severe allergic reaction.     · You have symptoms in more than one body area, such as mild nausea and an itchy mouth. After giving an epinephrine shot call 911, even if you feel better. Call 911 if:    · You have symptoms of a severe allergic reaction. These may include:  ? Sudden raised, red areas (hives) all over your body. ? Swelling of the throat, mouth, lips, or tongue. ? Trouble breathing. ? Passing out (losing consciousness).  Or you may feel very lightheaded or suddenly feel weak, confused, or restless.     · You have been given an epinephrine shot, even if you feel better. Call your doctor now or seek immediate medical care if:    · You have symptoms of an allergic reaction, such as:  ? A rash or hives (raised, red areas on the skin). ? Itching. ? Swelling. ? Belly pain, nausea, or vomiting. Watch closely for changes in your health, and be sure to contact your doctor if:    · You do not get better as expected. Where can you learn more? Go to https://Shiram CreditpeSETeb.Moment. org and sign in to your [employer] account. Enter I238 in the [employer] box to learn more about \"Allergies: Care Instructions. \"     If you do not have an account, please click on the \"Sign Up Now\" link. Current as of: February 10, 2021               Content Version: 13.0  © 3361-4312 [employer]. Care instructions adapted under license by [facility] ([facility]). If you have questions about a medical condition or this instruction, always ask your healthcare professional. [place] [address] any warranty or liability for your use of this information. Patient Education        Seizure: Care Instructions  Your Care Instructions     Seizures are caused by abnormal patterns of electrical signals in the brain. They are different for each person. Seizures can affect movement, speech, vision, or awareness. Some people have only slight shaking of a hand and do not pass out. Other people may pass out and have violent shaking of the whole body. Some people appear to stare into space. They are awake, but they can't respond normally. Later, they may not remember what happened. You may need tests to identify the type and cause of the seizures. A seizure may occur only once, or you may have them more than one time. Taking medicines as directed and following up with your doctor may help keep you from having more seizures.   The doctor has checked you carefully, but problems can develop later. If you notice any problems or new symptoms, get medical treatment right away. Follow-up care is a key part of your treatment and safety. Be sure to make and go to all appointments, and call your doctor if you are having problems. It's also a good idea to know your test results and keep a list of the medicines you take. How can you care for yourself at home? · Be safe with medicines. Take your medicines exactly as prescribed. Call your doctor if you think you are having a problem with your medicine. · Do not do any activity that could be dangerous to you or others until your doctor says it is safe to do so. For example, do not drive a car, operate machinery, swim, or climb ladders. · Be sure that anyone treating you for any health problem knows that you have had a seizure and what medicines you are taking for it. · Identify and avoid things that may make you more likely to have a seizure. These may include lack of sleep, alcohol or drug use, stress, or not eating. · Make sure you go to your follow-up appointment. When should you call for help? Call 911 anytime you think you may need emergency care. For example, call if:    · You have another seizure.     · You have new symptoms, such as trouble walking, speaking, or thinking clearly. Call your doctor now or seek immediate medical care if:    · You are not acting normally. Watch closely for changes in your health, and be sure to contact your doctor if you have any problems. Where can you learn more? Go to https://MusiwavebreonnaReliSen.[email or web address]. org and sign in to your [email or web address] account. Enter L948 in the [employer] box to learn more about \"Seizure: Care Instructions. \"     If you do not have an account, please click on the \"Sign Up Now\" link. Current as of: April 8, 2021               Content Version: 13.0  © 9206-1320 [employer].    Care instructions adapted under license by [id number]. If you have questions about a medical condition or this instruction, always ask your healthcare professional. [place] [address] any warranty or liability for your use of this information.

## 2021-11-10 RX ORDER — MEDROXYPROGESTERONE ACETATE 150 MG/ML
INJECTION, SUSPENSION INTRAMUSCULAR
Qty: 1 ML | Refills: 1 | Status: SHIPPED
Start: 2021-11-10 | End: 2022-01-14 | Stop reason: SDUPTHER

## 2021-11-12 DIAGNOSIS — R56.9 SEIZURES (HCC): ICD-10-CM

## 2021-11-12 DIAGNOSIS — Z79.899 ENCOUNTER FOR LONG-TERM CURRENT USE OF MEDICATION: ICD-10-CM

## 2021-11-12 DIAGNOSIS — Z13.220 SCREENING FOR LIPID DISORDERS: ICD-10-CM

## 2021-11-12 DIAGNOSIS — E43 UNSPECIFIED SEVERE PROTEIN-CALORIE MALNUTRITION (HCC): ICD-10-CM

## 2021-11-12 LAB
ALBUMIN SERPL-MCNC: 4.4 G/DL (ref 3.5–5.2)
ALP BLD-CCNC: 69 U/L (ref 35–104)
ALT SERPL-CCNC: 17 U/L (ref 0–32)
ANION GAP SERPL CALCULATED.3IONS-SCNC: 11 MMOL/L (ref 7–16)
AST SERPL-CCNC: 23 U/L (ref 0–31)
BASOPHILS ABSOLUTE: 0.05 E9/L (ref 0–0.2)
BASOPHILS RELATIVE PERCENT: 0.9 % (ref 0–2)
BILIRUB SERPL-MCNC: 0.4 MG/DL (ref 0–1.2)
BUN BLDV-MCNC: 9 MG/DL (ref 6–20)
BURR CELLS: ABNORMAL
CALCIUM SERPL-MCNC: 9.4 MG/DL (ref 8.6–10.2)
CHLORIDE BLD-SCNC: 103 MMOL/L (ref 98–107)
CHOLESTEROL, TOTAL: 210 MG/DL (ref 0–199)
CO2: 25 MMOL/L (ref 22–29)
CREAT SERPL-MCNC: 0.7 MG/DL (ref 0.5–1)
EOSINOPHILS ABSOLUTE: 0.09 E9/L (ref 0.05–0.5)
EOSINOPHILS RELATIVE PERCENT: 1.7 % (ref 0–6)
GFR AFRICAN AMERICAN: >60
GFR NON-AFRICAN AMERICAN: >60 ML/MIN/1.73
GLUCOSE FASTING: 83 MG/DL (ref 74–99)
HCT VFR BLD CALC: 46.6 % (ref 34–48)
HDLC SERPL-MCNC: 64 MG/DL
HEMOGLOBIN: 14.7 G/DL (ref 11.5–15.5)
HYPOCHROMIA: ABNORMAL
LDL CHOLESTEROL CALCULATED: 134 MG/DL (ref 0–99)
LYMPHOCYTES ABSOLUTE: 3.85 E9/L (ref 1.5–4)
LYMPHOCYTES RELATIVE PERCENT: 73.9 % (ref 20–42)
MCH RBC QN AUTO: 27.7 PG (ref 26–35)
MCHC RBC AUTO-ENTMCNC: 31.5 % (ref 32–34.5)
MCV RBC AUTO: 87.9 FL (ref 80–99.9)
MONOCYTES ABSOLUTE: 0.47 E9/L (ref 0.1–0.95)
MONOCYTES RELATIVE PERCENT: 8.7 % (ref 2–12)
NEUTROPHILS ABSOLUTE: 0.78 E9/L (ref 1.8–7.3)
NEUTROPHILS RELATIVE PERCENT: 14.8 % (ref 43–80)
PDW BLD-RTO: 12 FL (ref 11.5–15)
PLATELET # BLD: 93 E9/L (ref 130–450)
PLATELET CONFIRMATION: NORMAL
PMV BLD AUTO: 10.3 FL (ref 7–12)
POIKILOCYTES: ABNORMAL
POTASSIUM SERPL-SCNC: 3.8 MMOL/L (ref 3.5–5)
RBC # BLD: 5.3 E12/L (ref 3.5–5.5)
SODIUM BLD-SCNC: 139 MMOL/L (ref 132–146)
TOTAL PROTEIN: 7.7 G/DL (ref 6.4–8.3)
TRIGL SERPL-MCNC: 60 MG/DL (ref 0–149)
VITAMIN D 25-HYDROXY: 18 NG/ML (ref 30–100)
VLDLC SERPL CALC-MCNC: 12 MG/DL
WBC # BLD: 5.2 E9/L (ref 4.5–11.5)

## 2021-11-16 LAB — KEPPRA: 14 UG/ML (ref 12–46)

## 2021-11-18 DIAGNOSIS — G40.219 PARTIAL EPILEPSY WITH IMPAIRMENT OF CONSCIOUSNESS, INTRACTABLE, POORLY CONTROLLED (HCC): ICD-10-CM

## 2021-11-19 ENCOUNTER — NURSE ONLY (OUTPATIENT)
Dept: PRIMARY CARE CLINIC | Age: 31
End: 2021-11-19
Payer: MEDICARE

## 2021-11-19 PROCEDURE — 96372 THER/PROPH/DIAG INJ SC/IM: CPT | Performed by: NURSE PRACTITIONER

## 2021-11-19 RX ORDER — MEDROXYPROGESTERONE ACETATE 150 MG/ML
150 INJECTION, SUSPENSION INTRAMUSCULAR ONCE
Status: COMPLETED | OUTPATIENT
Start: 2021-11-19 | End: 2021-11-19

## 2021-11-19 RX ORDER — LEVETIRACETAM 100 MG/ML
SOLUTION ORAL
Qty: 465 ML | Refills: 0 | Status: SHIPPED
Start: 2021-11-19 | End: 2021-12-23

## 2021-11-19 RX ADMIN — MEDROXYPROGESTERONE ACETATE 150 MG: 150 INJECTION, SUSPENSION INTRAMUSCULAR at 09:41

## 2021-12-23 DIAGNOSIS — G40.219 PARTIAL EPILEPSY WITH IMPAIRMENT OF CONSCIOUSNESS, INTRACTABLE, POORLY CONTROLLED (HCC): ICD-10-CM

## 2021-12-23 RX ORDER — LEVETIRACETAM 100 MG/ML
SOLUTION ORAL
Qty: 465 ML | Refills: 0 | Status: SHIPPED
Start: 2021-12-23 | End: 2022-01-25

## 2021-12-28 ENCOUNTER — TELEPHONE (OUTPATIENT)
Dept: PRIMARY CARE CLINIC | Age: 31
End: 2021-12-28

## 2021-12-28 RX ORDER — DEXTROMETHORPHAN POLISTIREX 30 MG/5ML
30 SUSPENSION ORAL 2 TIMES DAILY PRN
Qty: 148 ML | Refills: 0 | Status: SHIPPED | OUTPATIENT
Start: 2021-12-28 | End: 2022-01-12

## 2022-01-03 ENCOUNTER — TELEPHONE (OUTPATIENT)
Dept: PRIMARY CARE CLINIC | Age: 32
End: 2022-01-03

## 2022-01-03 RX ORDER — GUAIFENESIN 600 MG/1
600 TABLET, EXTENDED RELEASE ORAL 2 TIMES DAILY
Qty: 30 TABLET | Refills: 0 | Status: SHIPPED
Start: 2022-01-03 | End: 2022-01-14 | Stop reason: SDUPTHER

## 2022-01-03 NOTE — TELEPHONE ENCOUNTER
Had home covid test this am and was neg    Eyad Mccullough would like mucinex x 15 days  sent in to [facility]

## 2022-01-04 ENCOUNTER — TELEPHONE (OUTPATIENT)
Dept: PRIMARY CARE CLINIC | Age: 32
End: 2022-01-04

## 2022-01-14 ENCOUNTER — VIRTUAL VISIT (OUTPATIENT)
Dept: PRIMARY CARE CLINIC | Age: 32
End: 2022-01-14
Payer: MEDICARE

## 2022-01-14 DIAGNOSIS — F79 MENTAL DISABILITY: ICD-10-CM

## 2022-01-14 DIAGNOSIS — H55.00 NYSTAGMUS: ICD-10-CM

## 2022-01-14 DIAGNOSIS — L30.9 ECZEMA OF UPPER EXTREMITY: Primary | ICD-10-CM

## 2022-01-14 DIAGNOSIS — G81.91 RIGHT HEMIPARESIS (HCC): ICD-10-CM

## 2022-01-14 DIAGNOSIS — Z79.899 ENCOUNTER FOR LONG-TERM CURRENT USE OF MEDICATION: ICD-10-CM

## 2022-01-14 DIAGNOSIS — J30.1 SEASONAL ALLERGIC RHINITIS DUE TO POLLEN: ICD-10-CM

## 2022-01-14 DIAGNOSIS — R56.9 SEIZURES (HCC): ICD-10-CM

## 2022-01-14 DIAGNOSIS — G81.91 HEMIPARESIS OF RIGHT DOMINANT SIDE DUE TO NON-CEREBROVASCULAR ETIOLOGY (HCC): ICD-10-CM

## 2022-01-14 DIAGNOSIS — E55.9 VITAMIN D DEFICIENCY: ICD-10-CM

## 2022-01-14 DIAGNOSIS — H54.7 DECREASED VISION: ICD-10-CM

## 2022-01-14 DIAGNOSIS — Z87.2 HISTORY OF ECZEMA: ICD-10-CM

## 2022-01-14 DIAGNOSIS — G80.2 SPASTIC HEMIPLEGIC CEREBRAL PALSY (HCC): ICD-10-CM

## 2022-01-14 DIAGNOSIS — E43 UNSPECIFIED SEVERE PROTEIN-CALORIE MALNUTRITION (HCC): ICD-10-CM

## 2022-01-14 PROCEDURE — 1036F TOBACCO NON-USER: CPT | Performed by: NURSE PRACTITIONER

## 2022-01-14 PROCEDURE — 99214 OFFICE O/P EST MOD 30 MIN: CPT | Performed by: NURSE PRACTITIONER

## 2022-01-14 PROCEDURE — G8420 CALC BMI NORM PARAMETERS: HCPCS | Performed by: NURSE PRACTITIONER

## 2022-01-14 PROCEDURE — G8482 FLU IMMUNIZE ORDER/ADMIN: HCPCS | Performed by: NURSE PRACTITIONER

## 2022-01-14 PROCEDURE — G8427 DOCREV CUR MEDS BY ELIG CLIN: HCPCS | Performed by: NURSE PRACTITIONER

## 2022-01-14 RX ORDER — GUAIFENESIN 600 MG/1
600 TABLET, EXTENDED RELEASE ORAL 2 TIMES DAILY
Qty: 30 TABLET | Refills: 0 | Status: SHIPPED | OUTPATIENT
Start: 2022-01-14 | End: 2022-01-29

## 2022-01-14 RX ORDER — MEDROXYPROGESTERONE ACETATE 150 MG/ML
INJECTION, SUSPENSION INTRAMUSCULAR
Qty: 1 ML | Refills: 1 | Status: SHIPPED
Start: 2022-01-14 | End: 2022-02-18

## 2022-01-14 RX ORDER — FLUTICASONE PROPIONATE 50 MCG
2 SPRAY, SUSPENSION (ML) NASAL DAILY
Qty: 1 EACH | Refills: 5 | Status: SHIPPED | OUTPATIENT
Start: 2022-01-14

## 2022-01-14 RX ORDER — TRIAMCINOLONE ACETONIDE 1 MG/G
CREAM TOPICAL
Qty: 160 G | Refills: 1 | Status: SHIPPED | OUTPATIENT
Start: 2022-01-14

## 2022-01-14 RX ORDER — LEVOCETIRIZINE DIHYDROCHLORIDE 5 MG/1
TABLET, FILM COATED ORAL
Qty: 16 TABLET | Refills: 5 | Status: SHIPPED
Start: 2022-01-14 | End: 2022-09-06

## 2022-01-14 RX ORDER — POLYETHYLENE GLYCOL 3350 17 G/17G
POWDER ORAL
Qty: 175 G | Refills: 5 | Status: SHIPPED
Start: 2022-01-14 | End: 2022-11-01

## 2022-01-14 RX ORDER — HYDROXYZINE HYDROCHLORIDE 25 MG/1
TABLET, FILM COATED ORAL
Qty: 31 TABLET | Refills: 5 | Status: SHIPPED | OUTPATIENT
Start: 2022-01-14

## 2022-01-14 ASSESSMENT — ENCOUNTER SYMPTOMS
NAUSEA: 0
VOMITING: 0
VOICE CHANGE: 0
COUGH: 0
CONSTIPATION: 0
CHEST TIGHTNESS: 0
BLOOD IN STOOL: 0
SHORTNESS OF BREATH: 0
WHEEZING: 0
DIARRHEA: 0
ABDOMINAL PAIN: 0
TROUBLE SWALLOWING: 0
BACK PAIN: 0
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
## 2022-01-14 NOTE — PATIENT INSTRUCTIONS
Patient Education        Rhinitis: Care Instructions  Your Care Instructions  Rhinitis is swelling and irritation in the nose. Allergies and infections are often the cause. Your nose may run or feel stuffy. Other symptoms are itchy and sore eyes, ears, throat, and mouth. If allergies are the cause, your doctor may do tests to find out what you are allergic to. You may be able to stop symptoms if you avoid the things that cause them. Your doctor may suggest or prescribe medicine to ease your symptoms. Follow-up care is a key part of your treatment and safety. Be sure to make and go to all appointments, and call your doctor if you are having problems. It's also a good idea to know your test results and keep a list of the medicines you take. How can you care for yourself at home? · If your rhinitis is caused by allergies, try to find out what sets off (triggers) your symptoms. Take steps to avoid these triggers. ? Avoid yard work. It can stir up both pollen and mold. ? Do not smoke or allow others to smoke around you. If you need help quitting, talk to your doctor about stop-smoking programs and medicines. These can increase your chances of quitting for good. ? Do not use aerosol sprays, cleaning products, or perfumes. ? If pollen is one of your triggers, close your house and car windows during blooming season. ? Clean your house often to control dust.  ? Keep pets outside. · If your doctor recommends over-the-counter medicines to relieve symptoms, take your medicines exactly as prescribed. Call your doctor if you think you are having a problem with your medicine. · Use saline (saltwater) nasal washes to help keep your nasal passages open and wash out mucus and bacteria. You can buy saline nose drops at a grocery store or drugstore. Or you can make your own at home by adding 1 teaspoon of salt and 1 teaspoon of baking soda to 2 cups of distilled water.  If you make your own, fill a bulb syringe with the later. If you notice any problems or new symptoms, get medical treatment right away. Follow-up care is a key part of your treatment and safety. Be sure to make and go to all appointments, and call your doctor if you are having problems. It's also a good idea to know your test results and keep a list of the medicines you take. How can you care for yourself at home? · Be safe with medicines. Take your medicines exactly as prescribed. Call your doctor if you think you are having a problem with your medicine. · Do not do any activity that could be dangerous to you or others until your doctor says it is safe to do so. For example, do not drive a car, operate machinery, swim, or climb ladders. · Be sure that anyone treating you for any health problem knows that you have had a seizure and what medicines you are taking for it. · Identify and avoid things that may make you more likely to have a seizure. These may include lack of sleep, alcohol or drug use, stress, or not eating. · Make sure you go to your follow-up appointment. When should you call for help? Call 911 anytime you think you may need emergency care. For example, call if:    · You have another seizure.     · You have new symptoms, such as trouble walking, speaking, or thinking clearly. Call your doctor now or seek immediate medical care if:    · You are not acting normally. Watch closely for changes in your health, and be sure to contact your doctor if you have any problems. Where can you learn more? Go to https://SulfagenixpekelleInteRNA Technologies.Learn It Live. org and sign in to your SmartHabitat account. Enter W706 in the [facility] box to learn more about \"Seizure: Care Instructions. \"     If you do not have an account, please click on the \"Sign Up Now\" link. Current as of: April 8, 2021               Content Version: 13.1  © 0043-0433 Healthwise, Incorporated. Care instructions adapted under license by [facility] ([facility]).  If you have questions about a medical condition or this instruction, always ask your healthcare professional. [address] any warranty or liability for your use of this information. Patient Education        Body Mass Index: Care Instructions  Your Care Instructions     Body mass index (BMI) can help you see if your weight is raising your risk for health problems. It uses a formula to compare how much you weigh with how tall you are. · A BMI lower than 18.5 is considered underweight. · A BMI between 18.5 and 24.9 is considered healthy. · A BMI between 25 and 29.9 is considered overweight. A BMI of 30 or higher is considered obese. If your BMI is in the normal range, it means that you have a lower risk for weight-related health problems. If your BMI is in the overweight or obese range, you may be at increased risk for weight-related health problems, such as high blood pressure, heart disease, stroke, arthritis or joint pain, and diabetes. If your BMI is in the underweight range, you may be at increased risk for health problems such as fatigue, lower protection (immunity) against illness, muscle loss, bone loss, hair loss, and hormone problems. BMI is just one measure of your risk for weight-related health problems. You may be at higher risk for health problems if you are not active, you eat an unhealthy diet, or you drink too much alcohol or use tobacco products. Follow-up care is a key part of your treatment and safety. Be sure to make and go to all appointments, and call your doctor if you are having problems. It's also a good idea to know your test results and keep a list of the medicines you take. How can you care for yourself at home? · Practice healthy eating habits. This includes eating plenty of fruits, vegetables, whole grains, lean protein, and low-fat dairy. · If your doctor recommends it, get more exercise. Walking is a good choice. Bit by bit, increase the amount you walk every day.  Try for at least 30 minutes on most days of the week. · Do not smoke. Smoking can increase your risk for health problems. If you need help quitting, talk to your doctor about stop-smoking programs and medicines. These can increase your chances of quitting for good. · Limit alcohol to 2 drinks a day for men and 1 drink a day for women. Too much alcohol can cause health problems. If you have a BMI higher than 25  · Your doctor may do other tests to check your risk for weight-related health problems. This may include measuring the distance around your waist. A waist measurement of more than 40 inches in men or 35 inches in women can increase the risk of weight-related health problems. · Talk with your doctor about steps you can take to stay healthy or improve your health. You may need to make lifestyle changes to lose weight and stay healthy, such as changing your diet and getting regular exercise. If you have a BMI lower than 18.5  · Your doctor may do other tests to check your risk for health problems. · Talk with your doctor about steps you can take to stay healthy or improve your health. You may need to make lifestyle changes to gain or maintain weight and stay healthy, such as getting more healthy foods in your diet and doing exercises to build muscle. Where can you learn more? Go to https://LittlecastpeGoing My Way.healthWireOver. org and sign in to your ClickShift account. Enter S176 in the [facility] box to learn more about \"Body Mass Index: Care Instructions. \"     If you do not have an account, please click on the \"Sign Up Now\" link. Current as of: March 17, 2021               Content Version: 13.1  © 2006-2021 Healthwise, Incorporated. Care instructions adapted under license by [facility] ([facility]). If you have questions about a medical condition or this instruction, always ask your healthcare professional. [address] any warranty or liability for your use of this information.

## 2022-01-25 DIAGNOSIS — G40.219 PARTIAL EPILEPSY WITH IMPAIRMENT OF CONSCIOUSNESS, INTRACTABLE, POORLY CONTROLLED (HCC): ICD-10-CM

## 2022-01-25 RX ORDER — LEVETIRACETAM 100 MG/ML
SOLUTION ORAL
Qty: 465 ML | Refills: 0 | Status: SHIPPED
Start: 2022-01-25 | End: 2022-02-23

## 2022-02-18 ENCOUNTER — NURSE ONLY (OUTPATIENT)
Dept: PRIMARY CARE CLINIC | Age: 32
End: 2022-02-18
Payer: MEDICARE

## 2022-02-18 DIAGNOSIS — G80.2 SPASTIC HEMIPLEGIC CEREBRAL PALSY (HCC): ICD-10-CM

## 2022-02-18 PROCEDURE — 96372 THER/PROPH/DIAG INJ SC/IM: CPT | Performed by: NURSE PRACTITIONER

## 2022-02-18 RX ORDER — MEDROXYPROGESTERONE ACETATE 150 MG/ML
150 INJECTION, SUSPENSION INTRAMUSCULAR ONCE
Status: COMPLETED | OUTPATIENT
Start: 2022-02-18 | End: 2022-02-18

## 2022-02-18 RX ORDER — MEDROXYPROGESTERONE ACETATE 150 MG/ML
150 INJECTION, SUSPENSION INTRAMUSCULAR ONCE
Qty: 1 ML | Refills: 3
Start: 2022-02-18 | End: 2022-02-18

## 2022-02-18 RX ADMIN — MEDROXYPROGESTERONE ACETATE 150 MG: 150 INJECTION, SUSPENSION INTRAMUSCULAR at 09:30

## 2022-02-22 DIAGNOSIS — G40.219 PARTIAL EPILEPSY WITH IMPAIRMENT OF CONSCIOUSNESS, INTRACTABLE, POORLY CONTROLLED (HCC): ICD-10-CM

## 2022-02-23 RX ORDER — LEVETIRACETAM 100 MG/ML
SOLUTION ORAL
Qty: 1 EACH | Refills: 0 | Status: SHIPPED
Start: 2022-02-23 | End: 2022-03-29

## 2022-03-29 DIAGNOSIS — G40.219 PARTIAL EPILEPSY WITH IMPAIRMENT OF CONSCIOUSNESS, INTRACTABLE, POORLY CONTROLLED (HCC): ICD-10-CM

## 2022-03-29 RX ORDER — LEVETIRACETAM 100 MG/ML
SOLUTION ORAL
Qty: 450 ML | Refills: 11 | Status: SHIPPED
Start: 2022-03-29 | End: 2022-03-30 | Stop reason: SDUPTHER

## 2022-03-30 DIAGNOSIS — G40.219 PARTIAL EPILEPSY WITH IMPAIRMENT OF CONSCIOUSNESS, INTRACTABLE, POORLY CONTROLLED (HCC): ICD-10-CM

## 2022-03-31 RX ORDER — LEVETIRACETAM 100 MG/ML
SOLUTION ORAL
Qty: 473 ML | Refills: 11 | Status: SHIPPED
Start: 2022-03-31 | End: 2022-08-25

## 2022-04-18 ENCOUNTER — OFFICE VISIT (OUTPATIENT)
Dept: PRIMARY CARE CLINIC | Age: 32
End: 2022-04-18
Payer: MEDICARE

## 2022-04-18 VITALS
HEART RATE: 120 BPM | WEIGHT: 71 LBS | SYSTOLIC BLOOD PRESSURE: 94 MMHG | OXYGEN SATURATION: 98 % | BODY MASS INDEX: 16.43 KG/M2 | TEMPERATURE: 97.8 F | DIASTOLIC BLOOD PRESSURE: 66 MMHG | HEIGHT: 55 IN

## 2022-04-18 DIAGNOSIS — L30.9 ECZEMA OF UPPER EXTREMITY: ICD-10-CM

## 2022-04-18 DIAGNOSIS — F79 MENTAL DISABILITY: ICD-10-CM

## 2022-04-18 DIAGNOSIS — R56.9 SEIZURES (HCC): ICD-10-CM

## 2022-04-18 DIAGNOSIS — G81.91 HEMIPARESIS OF RIGHT DOMINANT SIDE DUE TO NON-CEREBROVASCULAR ETIOLOGY (HCC): ICD-10-CM

## 2022-04-18 DIAGNOSIS — Z00.00 ROUTINE GENERAL MEDICAL EXAMINATION AT A HEALTH CARE FACILITY: Primary | ICD-10-CM

## 2022-04-18 DIAGNOSIS — G80.2 SPASTIC HEMIPLEGIC CEREBRAL PALSY (HCC): ICD-10-CM

## 2022-04-18 PROCEDURE — G8427 DOCREV CUR MEDS BY ELIG CLIN: HCPCS | Performed by: NURSE PRACTITIONER

## 2022-04-18 PROCEDURE — G8420 CALC BMI NORM PARAMETERS: HCPCS | Performed by: NURSE PRACTITIONER

## 2022-04-18 PROCEDURE — 99213 OFFICE O/P EST LOW 20 MIN: CPT | Performed by: NURSE PRACTITIONER

## 2022-04-18 PROCEDURE — 1036F TOBACCO NON-USER: CPT | Performed by: NURSE PRACTITIONER

## 2022-04-18 ASSESSMENT — ENCOUNTER SYMPTOMS
COLOR CHANGE: 0
RECTAL PAIN: 0
ABDOMINAL PAIN: 0
ANAL BLEEDING: 0
BLOOD IN STOOL: 0

## 2022-04-18 NOTE — PROGRESS NOTES
Jaqueline Bradshaw (:  1990) is a 34 y.o. female,Established patient, here for evaluation of the following chief complaint(s): Annual Exam (physical exam for special olympBookBottles)  Height: 4' (1.219 m), Weight: 71 lb (32.2 kg), BP: 94/66         ASSESSMENT/PLAN:  1. Routine general medical examination at a health care facility  2. Spastic hemiplegic cerebral palsy (HCC)  3. Eczema of upper extremity  4. Seizures ([facility] [address]  5. Hemiparesis of right dominant side due to non-cerebrovascular etiology ([facility] [address]  6. Mental disability      No follow-ups on file. Subjective   SUBJECTIVE/OBJECTIVE:  This is a very pleasant developmentally delayed patient who I went to there home/workshop to do a physical exam for clearance for Special [employer]way. Patient had no complaints at this time. Vitals, care gaps and medical history was reviewed. Also recent labs and diagnostics for any possible complications with Special Nexway interest.  Although wheelchair-bound she did really good last year at [facility]      Review of Systems   Constitutional: Negative for activity change and fever. HENT: Negative for nosebleeds. Cardiovascular: Negative for chest pain, palpitations and leg swelling. Gastrointestinal: Negative for abdominal pain, anal bleeding, blood in stool and rectal pain. Genitourinary: Negative for hematuria. Musculoskeletal: Positive for gait problem. Skin: Negative for color change and pallor. Neurological: Negative for light-headedness and headaches. Hematological: Does not bruise/bleed easily. Objective   Physical Exam  Constitutional:       Appearance: Normal appearance. HENT:      Head: Normocephalic. Nose: Nose normal.   Eyes:      Pupils: Pupils are equal, round, and reactive to light. Cardiovascular:      Rate and Rhythm: Normal rate and regular rhythm. Pulmonary:      Effort: Pulmonary effort is normal.   Abdominal:      General: Abdomen is flat.       Palpations: Abdomen is soft.   Musculoskeletal:      Comments: Spastic limited range of motion   Skin:     General: Skin is warm and dry. Neurological:      General: No focal deficit present. Mental Status: She is alert. Mental status is at baseline. Psychiatric:         Mood and Affect: Mood normal.            On this date 4/18/2022 I have spent 15 minutes reviewing previous notes, test results and face to face with the patient discussing the diagnosis and importance of compliance with the treatment plan as well as documenting on the day of the visit. An electronic signature was used to authenticate this note.         --DANI Hein - CNP

## 2022-05-03 RX ORDER — ACETAMINOPHEN 160 MG/5ML
LIQUID ORAL
Qty: 240 ML | Refills: 0 | Status: SHIPPED | OUTPATIENT
Start: 2022-05-03

## 2022-05-03 NOTE — TELEPHONE ENCOUNTER
Patients last appointment 4/18/2022.   Patients next scheduled appointment   Future Appointments   Date Time Provider Karley Villalta   6/6/2022  3:45 PM DANI Magana - CNP [address] [place] [address] Dr PIPER [facility]

## 2022-07-18 ENCOUNTER — OFFICE VISIT (OUTPATIENT)
Dept: PRIMARY CARE CLINIC | Age: 32
End: 2022-07-18
Payer: MEDICARE

## 2022-07-18 VITALS
HEART RATE: 100 BPM | RESPIRATION RATE: 20 BRPM | SYSTOLIC BLOOD PRESSURE: 110 MMHG | TEMPERATURE: 97.9 F | OXYGEN SATURATION: 99 % | DIASTOLIC BLOOD PRESSURE: 70 MMHG | WEIGHT: 67.8 LBS | BODY MASS INDEX: 15.69 KG/M2 | HEIGHT: 55 IN

## 2022-07-18 DIAGNOSIS — S91.312A LACERATION OF LEFT FOOT, INITIAL ENCOUNTER: Primary | ICD-10-CM

## 2022-07-18 DIAGNOSIS — Z13.220 SCREENING FOR LIPOID DISORDERS: ICD-10-CM

## 2022-07-18 DIAGNOSIS — G81.91 HEMIPARESIS OF RIGHT DOMINANT SIDE DUE TO NON-CEREBROVASCULAR ETIOLOGY (HCC): ICD-10-CM

## 2022-07-18 DIAGNOSIS — L30.9 ECZEMA OF UPPER EXTREMITY: ICD-10-CM

## 2022-07-18 DIAGNOSIS — R46.89 BEHAVIOR CONCERN: ICD-10-CM

## 2022-07-18 DIAGNOSIS — Z00.00 ROUTINE GENERAL MEDICAL EXAMINATION AT A HEALTH CARE FACILITY: ICD-10-CM

## 2022-07-18 DIAGNOSIS — E55.9 VITAMIN D DEFICIENCY: ICD-10-CM

## 2022-07-18 DIAGNOSIS — Z13.29 SCREENING FOR THYROID DISORDER: ICD-10-CM

## 2022-07-18 DIAGNOSIS — R56.9 SEIZURES (HCC): ICD-10-CM

## 2022-07-18 DIAGNOSIS — F51.4 NIGHT TERRORS, ADULT: ICD-10-CM

## 2022-07-18 DIAGNOSIS — F79 MENTAL DISABILITY: ICD-10-CM

## 2022-07-18 LAB
ALBUMIN SERPL-MCNC: 5.1 G/DL (ref 3.5–5.2)
ALP BLD-CCNC: 64 U/L (ref 35–104)
ALT SERPL-CCNC: 12 U/L (ref 0–32)
ANION GAP SERPL CALCULATED.3IONS-SCNC: 17 MMOL/L (ref 7–16)
AST SERPL-CCNC: 23 U/L (ref 0–31)
BASOPHILS ABSOLUTE: 0.02 E9/L (ref 0–0.2)
BASOPHILS RELATIVE PERCENT: 0.5 % (ref 0–2)
BILIRUB SERPL-MCNC: 0.4 MG/DL (ref 0–1.2)
BUN BLDV-MCNC: 12 MG/DL (ref 6–20)
CALCIUM SERPL-MCNC: 10.1 MG/DL (ref 8.6–10.2)
CHLORIDE BLD-SCNC: 100 MMOL/L (ref 98–107)
CHOLESTEROL, TOTAL: 242 MG/DL (ref 0–199)
CO2: 21 MMOL/L (ref 22–29)
CREAT SERPL-MCNC: 0.8 MG/DL (ref 0.5–1)
EOSINOPHILS ABSOLUTE: 0.04 E9/L (ref 0.05–0.5)
EOSINOPHILS RELATIVE PERCENT: 0.9 % (ref 0–6)
GFR AFRICAN AMERICAN: >60
GFR NON-AFRICAN AMERICAN: >60 ML/MIN/1.73
GLUCOSE FASTING: 79 MG/DL (ref 74–99)
HCT VFR BLD CALC: 48.9 % (ref 34–48)
HDLC SERPL-MCNC: 76 MG/DL
HEMOGLOBIN: 15.3 G/DL (ref 11.5–15.5)
IMMATURE GRANULOCYTES #: 0 E9/L
IMMATURE GRANULOCYTES %: 0 % (ref 0–5)
LDL CHOLESTEROL CALCULATED: 154 MG/DL (ref 0–99)
LYMPHOCYTES ABSOLUTE: 2.91 E9/L (ref 1.5–4)
LYMPHOCYTES RELATIVE PERCENT: 67.8 % (ref 20–42)
MCH RBC QN AUTO: 27.4 PG (ref 26–35)
MCHC RBC AUTO-ENTMCNC: 31.3 % (ref 32–34.5)
MCV RBC AUTO: 87.6 FL (ref 80–99.9)
MONOCYTES ABSOLUTE: 0.35 E9/L (ref 0.1–0.95)
MONOCYTES RELATIVE PERCENT: 8.2 % (ref 2–12)
NEUTROPHILS ABSOLUTE: 0.97 E9/L (ref 1.8–7.3)
NEUTROPHILS RELATIVE PERCENT: 22.6 % (ref 43–80)
PDW BLD-RTO: 12.4 FL (ref 11.5–15)
PLATELET # BLD: 81 E9/L (ref 130–450)
PLATELET CONFIRMATION: NORMAL
PMV BLD AUTO: 10.3 FL (ref 7–12)
POTASSIUM SERPL-SCNC: 3.9 MMOL/L (ref 3.5–5)
RBC # BLD: 5.58 E12/L (ref 3.5–5.5)
SODIUM BLD-SCNC: 138 MMOL/L (ref 132–146)
TOTAL PROTEIN: 8.6 G/DL (ref 6.4–8.3)
TRIGL SERPL-MCNC: 59 MG/DL (ref 0–149)
TSH SERPL DL<=0.05 MIU/L-ACNC: 0.92 UIU/ML (ref 0.27–4.2)
VITAMIN D 25-HYDROXY: 23 NG/ML (ref 30–100)
VLDLC SERPL CALC-MCNC: 12 MG/DL
WBC # BLD: 4.3 E9/L (ref 4.5–11.5)

## 2022-07-18 PROCEDURE — 99214 OFFICE O/P EST MOD 30 MIN: CPT | Performed by: NURSE PRACTITIONER

## 2022-07-18 PROCEDURE — G8420 CALC BMI NORM PARAMETERS: HCPCS | Performed by: NURSE PRACTITIONER

## 2022-07-18 PROCEDURE — G8427 DOCREV CUR MEDS BY ELIG CLIN: HCPCS | Performed by: NURSE PRACTITIONER

## 2022-07-18 PROCEDURE — 1036F TOBACCO NON-USER: CPT | Performed by: NURSE PRACTITIONER

## 2022-07-18 RX ORDER — MEDROXYPROGESTERONE ACETATE 150 MG/ML
1 INJECTION, SUSPENSION INTRAMUSCULAR
COMMUNITY
Start: 2022-05-03 | End: 2022-08-09

## 2022-07-18 SDOH — ECONOMIC STABILITY: FOOD INSECURITY: WITHIN THE PAST 12 MONTHS, YOU WORRIED THAT YOUR FOOD WOULD RUN OUT BEFORE YOU GOT MONEY TO BUY MORE.: NEVER TRUE

## 2022-07-18 SDOH — ECONOMIC STABILITY: FOOD INSECURITY: WITHIN THE PAST 12 MONTHS, THE FOOD YOU BOUGHT JUST DIDN'T LAST AND YOU DIDN'T HAVE MONEY TO GET MORE.: NEVER TRUE

## 2022-07-18 ASSESSMENT — SOCIAL DETERMINANTS OF HEALTH (SDOH): HOW HARD IS IT FOR YOU TO PAY FOR THE VERY BASICS LIKE FOOD, HOUSING, MEDICAL CARE, AND HEATING?: NOT HARD AT ALL

## 2022-07-18 ASSESSMENT — PATIENT HEALTH QUESTIONNAIRE - PHQ9
1. LITTLE INTEREST OR PLEASURE IN DOING THINGS: 0
2. FEELING DOWN, DEPRESSED OR HOPELESS: 0
SUM OF ALL RESPONSES TO PHQ QUESTIONS 1-9: 0
SUM OF ALL RESPONSES TO PHQ9 QUESTIONS 1 & 2: 0
SUM OF ALL RESPONSES TO PHQ QUESTIONS 1-9: 0

## 2022-07-18 ASSESSMENT — ENCOUNTER SYMPTOMS
COLOR CHANGE: 0
ANAL BLEEDING: 0
ABDOMINAL PAIN: 0
RECTAL PAIN: 0
BLOOD IN STOOL: 0

## 2022-07-18 NOTE — PROGRESS NOTES
Nga Erickson (:  1990) is a 34 y.o. female,Established patient, here for evaluation of the following chief complaint(s): Foot Injury (She cut one finger on left foot and caregiver needs you to check it out), Insomnia (It has been going on for a couple of weeks, she has been screaming more), and Other (Order for maintenance of hair)  Height: 4' (1.219 m), Weight: 67 lb 12.8 oz (30.8 kg), BP: 110/70         ASSESSMENT/PLAN:  1. Laceration of left foot, initial encounter  2. Behavior concern  -     CBC with Auto Differential; Future  -     Comprehensive Metabolic Panel, Fasting; Future  3. Seizures (HCC)  -     Levetiracetam Level; Future  4. Eczema of upper extremity  5. Hemiparesis of right dominant side due to non-cerebrovascular etiology ([facility] [address]  6. Mental disability  7. Routine general medical examination at a health care facility  8. Vitamin D deficiency  -     Vitamin D 25 Hydroxy; Future  9. Night terrors, adult  Comments:  seems to be staff related due to resembelance to MOM and triggering some PTSD  10. Screening for thyroid disorder  -     TSH; Future  11. Screening for lipoid disorders  -     Lipid Panel; Future      Return in about 6 months (around 2023), or if symptoms worsen or fail to improve. Subjective   SUBJECTIVE/OBJECTIVE:  Mally Aguilar  presents today for evaluation and management of chronic medical problems. Current medication list reviewed. The patient is tolerating all medications well without adverse events or known side effects. The patient does understand the risk and benefits of the prescribed medications. The patient is not up-to-date on all age-appropriate wellness issues. Patient denies any reccent hospitalizations or ER visit.         Acute Complaints reported:  Small laceration to the under part of the pinky nail left foot healing well no redness      LAST   VISIT  This is a very pleasant developmentally delayed patient who I went to there home/workshop to do a physical exam for clearance for Special Zenfolio. Patient had no complaints at this time. Vitals, care gaps and medical history was reviewed. Also recent labs and diagnostics for any possible complications with Special Zenfolio interest.  Although wheelchair-bound she did really good last year at [facility]      Review of Systems   Constitutional:  Negative for activity change and fever. HENT:  Negative for nosebleeds. Cardiovascular:  Negative for chest pain, palpitations and leg swelling. Gastrointestinal:  Negative for abdominal pain, anal bleeding, blood in stool and rectal pain. Genitourinary:  Negative for hematuria. Musculoskeletal:  Positive for gait problem. Skin:  Negative for color change and pallor. Neurological:  Negative for light-headedness and headaches. Hematological:  Does not bruise/bleed easily. Objective   Physical Exam  Constitutional:       Appearance: Normal appearance. HENT:      Head: Normocephalic. Nose: Nose normal.   Eyes:      Pupils: Pupils are equal, round, and reactive to light. Cardiovascular:      Rate and Rhythm: Normal rate and regular rhythm. Pulmonary:      Effort: Pulmonary effort is normal.   Abdominal:      General: Abdomen is flat. Palpations: Abdomen is soft. Musculoskeletal:      Comments: Spastic limited range of motion   Skin:     General: Skin is warm and dry. Neurological:      General: No focal deficit present. Mental Status: She is alert. Mental status is at baseline. Psychiatric:         Mood and Affect: Mood normal.            On this date 4/18/2022 I have spent 15 minutes reviewing previous notes, test results and face to face with the patient discussing the diagnosis and importance of compliance with the treatment plan as well as documenting on the day of the visit. An electronic signature was used to authenticate this note.         --DANI Navarro - CNP

## 2022-07-20 LAB — KEPPRA: 6 UG/ML (ref 10–40)

## 2022-08-01 ENCOUNTER — TELEPHONE (OUTPATIENT)
Dept: NEUROLOGY | Age: 32
End: 2022-08-01

## 2022-08-01 NOTE — TELEPHONE ENCOUNTER
FYI, patient had seizure last week. It lasted 1 min and 24 sec.   Electronically signed by Chidi Layne MA on 8/1/22 at 11:31 AM EDT

## 2022-08-09 RX ORDER — MEDROXYPROGESTERONE ACETATE 150 MG/ML
INJECTION, SUSPENSION INTRAMUSCULAR
Qty: 1 ML | Refills: 0 | Status: SHIPPED
Start: 2022-08-09 | End: 2022-08-12 | Stop reason: SDUPTHER

## 2022-08-09 NOTE — TELEPHONE ENCOUNTER
Patients last appointment 7/18/2022.   Patients next scheduled appointment   Future Appointments   Date Time Provider Karley Villalta   9/7/2022 11:00 AM DANI Garber - CNS [facility]   1/20/2023  9:30 AM DANI Tran - CNP [facility]

## 2022-08-10 RX ORDER — MEDROXYPROGESTERONE ACETATE 150 MG/ML
INJECTION, SUSPENSION INTRAMUSCULAR
Qty: 1 ML | Refills: 0 | OUTPATIENT
Start: 2022-08-10

## 2022-08-11 NOTE — TELEPHONE ENCOUNTER
Patients last appointment 7/18/2022.   Patients next scheduled appointment   Future Appointments   Date Time Provider Karley Villalta   9/7/2022 11:00 AM DANI Stevenson - CNS [facility]   1/20/2023  9:30 AM DANI Castillo - CNP [facility]      This is for the next shot, she already got the one on the 08/09/22

## 2022-08-12 RX ORDER — MEDROXYPROGESTERONE ACETATE 150 MG/ML
150 INJECTION, SUSPENSION INTRAMUSCULAR
Qty: 1 ML | Refills: 1 | Status: SHIPPED | OUTPATIENT
Start: 2022-08-12

## 2022-08-17 ENCOUNTER — OFFICE VISIT (OUTPATIENT)
Dept: PRIMARY CARE CLINIC | Age: 32
End: 2022-08-17
Payer: MEDICARE

## 2022-08-17 VITALS
HEART RATE: 72 BPM | TEMPERATURE: 97.5 F | DIASTOLIC BLOOD PRESSURE: 60 MMHG | SYSTOLIC BLOOD PRESSURE: 100 MMHG | OXYGEN SATURATION: 91 %

## 2022-08-17 DIAGNOSIS — G80.2 SPASTIC HEMIPLEGIC CEREBRAL PALSY (HCC): ICD-10-CM

## 2022-08-17 DIAGNOSIS — N64.9 DISORDER OF BREAST, UNSPECIFIED: ICD-10-CM

## 2022-08-17 DIAGNOSIS — R56.9 SEIZURES (HCC): ICD-10-CM

## 2022-08-17 DIAGNOSIS — N63.20 MASS OF LEFT BREAST: Primary | ICD-10-CM

## 2022-08-17 DIAGNOSIS — G81.91 HEMIPARESIS OF RIGHT DOMINANT SIDE DUE TO NON-CEREBROVASCULAR ETIOLOGY (HCC): ICD-10-CM

## 2022-08-17 PROBLEM — E78.5 HYPERLIPIDEMIA: Status: ACTIVE | Noted: 2022-08-17

## 2022-08-17 PROCEDURE — G8427 DOCREV CUR MEDS BY ELIG CLIN: HCPCS | Performed by: NURSE PRACTITIONER

## 2022-08-17 PROCEDURE — G8420 CALC BMI NORM PARAMETERS: HCPCS | Performed by: NURSE PRACTITIONER

## 2022-08-17 PROCEDURE — 1036F TOBACCO NON-USER: CPT | Performed by: NURSE PRACTITIONER

## 2022-08-17 PROCEDURE — 99213 OFFICE O/P EST LOW 20 MIN: CPT | Performed by: NURSE PRACTITIONER

## 2022-08-17 ASSESSMENT — ENCOUNTER SYMPTOMS
ANAL BLEEDING: 0
RECTAL PAIN: 0
ABDOMINAL PAIN: 0
BLOOD IN STOOL: 0
APNEA: 0
VOMITING: 0
NAUSEA: 0
EYE DISCHARGE: 0
BACK PAIN: 0
SORE THROAT: 0
COLOR CHANGE: 0
CHEST TIGHTNESS: 0
EYE PAIN: 0
WHEEZING: 0
SINUS PRESSURE: 0
EYE ITCHING: 0
EYE REDNESS: 0
CHOKING: 0
RHINORRHEA: 0
PHOTOPHOBIA: 0
ABDOMINAL DISTENTION: 0
VOICE CHANGE: 0
TROUBLE SWALLOWING: 0
STRIDOR: 0
SHORTNESS OF BREATH: 0
COUGH: 0
FACIAL SWELLING: 0
DIARRHEA: 0
SINUS PAIN: 0
CONSTIPATION: 0
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
## 2022-08-18 ENCOUNTER — TELEPHONE (OUTPATIENT)
Dept: PRIMARY CARE CLINIC | Age: 32
End: 2022-08-18

## 2022-08-18 DIAGNOSIS — N64.59 OTHER SIGNS AND SYMPTOMS IN BREAST: ICD-10-CM

## 2022-08-18 DIAGNOSIS — N63.0 MASS OF BREAST: Primary | ICD-10-CM

## 2022-08-18 DIAGNOSIS — N63.21 MASS OF UPPER OUTER QUADRANT OF LEFT BREAST: Primary | ICD-10-CM

## 2022-08-18 NOTE — TELEPHONE ENCOUNTER
Kit Lamar from [facility] called and states that [facility] needs a new order with a more specific dx as to where the mass is in patient's breast (what quadrant) for right breast US    Can you redo order?   Thanks MONALISA

## 2022-08-25 ENCOUNTER — TELEPHONE (OUTPATIENT)
Dept: ADMINISTRATIVE | Age: 32
End: 2022-08-25

## 2022-08-25 ENCOUNTER — SCHEDULED TELEPHONE ENCOUNTER (OUTPATIENT)
Dept: NEUROLOGY | Age: 32
End: 2022-08-25
Payer: MEDICARE

## 2022-08-25 DIAGNOSIS — G40.219 PARTIAL EPILEPSY WITH IMPAIRMENT OF CONSCIOUSNESS, INTRACTABLE, POORLY CONTROLLED (HCC): Primary | ICD-10-CM

## 2022-08-25 PROCEDURE — 99442 PR PHYS/QHP TELEPHONE EVALUATION 11-20 MIN: CPT | Performed by: CLINICAL NURSE SPECIALIST

## 2022-08-25 RX ORDER — LEVETIRACETAM 100 MG/ML
SOLUTION ORAL
Qty: 450 ML | Refills: 3 | Status: SHIPPED | OUTPATIENT
Start: 2022-08-25

## 2022-08-25 NOTE — PROGRESS NOTES
Micheal Marquis is a 34 y. o.female       evaluated via telephone on 8/25/2022. Consent:  She and/or health care decision maker is aware that that she may receive a bill for this telephone service, depending on her insurance coverage, and has provided verbal consent to proceed: Yes    I affirm this is a Patient Initiated Episode with an Established Patient who has not had a related appointment within my department in the past 7 days or scheduled within the next 24 hours. The patient's identity was verified at the start of the call. Others involved in call: Total Time: minutes: 11-20 minutes  Consent:  The patient and/or health care decision maker is aware that that he may receive a bill for this telephone service, depending on his insurance coverage, and has provided verbal consent to proceed: Yes  Patient advised regarding steps to help prevent the spread of COVID-19   SOURCE - https://chema-kenney.info/. html   1-Stay home except to get medical care  2-Clean your hands often for atleast 20 secnds, avoid touching: Avoid touching your eyes, nose, and mouth with unwashed hands. 3-Seek medical attention: Seek prompt medical attention if your illness is worsening (e.g., difficulty breathing). Call you doctor first.  3-Wear a facemask if you are sick   4-Cover your coughs and sneezes          Note: not billable if this call serves to triage the patient into an appointment for the relevant concern   Long history of seizure disorder   Follows annually since 2013    previously seeing Anne Mcnally    Patient suffered 1 seizure last month    Lab work revealed thrombocytopenia and physical exam by caregiver noted a mass in her breasts which is scheduled for ultrasound and the next 2 weeks    Maintained on Keppra 500mg BID   Tolerating well --Keppra level notably 6 in July 2022    ROS limited d/t patient condition     Prior to Visit Medications    Medication Sig Taking? Authorizing Provider   medroxyPROGESTERone (DEPO-PROVERA) 150 MG/ML injection Inject 150 mg into the muscle every 3 months  DANI Padilla CNP   Acetaminophen Childrens 160 MG/5ML SOLN TAKE 2 TEASPOONSFUL (10ML) BY MOUTH EVERY FOUR HOURS AS NEEDED FOR ELEVATED TEMPERATURE GREATER THAN 100.6  Mannie DANI Qurales CNP   levETIRAcetam (KEPPRA) 100 MG/ML solution TAKE 1/2 TABLESPOON (7.5 ML) BY MOUTH TWICE DAILY (PLUG AND SYRINGE)  DANI Locke   fluticasone (FLONASE) 50 MCG/ACT nasal spray 2 sprays by Each Nostril route daily  DANI Padilla CNP   hydrOXYzine (ATARAX) 25 MG tablet TAKE 1 TABLET BY MOUTH AT BEDTIME. DANI Padilla CNP   ibuprofen (ADVIL;MOTRIN) 100 MG/5ML suspension TAKE 2 TEASPOONSFUL (10ML) BY MOUTH EVERY 6 HOURS AS NEEDED FOR PAIN  DANI Valdovinos CNP   levocetirizine (XYZAL) 5 MG tablet TAKE (1/2) TABLET BY MOUTH ONCE DAILY. DANI Padilla CNP   polyethylene glycol (MIRALAX) 17 GM/SCOOP POWD powder MIX 1 TABLESPOONFUL IN 4-6 OUNCES OF LIQUID AND DRINK 3 TIMES A WEEK ([id number]) .   DANI Padilla CNP   bismuth subsalicylate (STOMACH RELIEF) 262 MG/15ML suspension TAKE 2 TABLESPOONSFUL (30ML) BY MOUTH EVERY 4 HOURS AS NEEDED FOR UPSET STOMACH OR DIARRHEA  DANI Valdovinos CNP   triamcinolone (KENALOG) 0.1 % cream Apply topically 2 times daily for two weeks then one week off PRN  DANI Padilla CNP     Allergies as of 08/25/2022 - Fully Reviewed 08/17/2022   Allergen Reaction Noted    No known allergies  09/23/2020       Objective:       Laboratory/Radiology:     CBC with Differential:    Lab Results   Component Value Date/Time    WBC 4.3 07/18/2022 11:16 AM    RBC 5.58 07/18/2022 11:16 AM    HGB 15.3 07/18/2022 11:16 AM    HCT 48.9 07/18/2022 11:16 AM    PLT 81 07/18/2022 11:16 AM    MCV 87.6 07/18/2022 11:16 AM    MCH 27.4 07/18/2022 11:16 AM    MCHC 31.3 07/18/2022 11:16 AM    RDW 12.4 07/18/2022 11:16 AM    LYMPHOPCT 67.8 07/18/2022 11:16 AM    MONOPCT 8.2 07/18/2022 11:16 AM    EOSPCT 0.6 07/30/2020 12:00 AM    BASOPCT 0.5 07/18/2022 11:16 AM    MONOSABS 0.35 07/18/2022 11:16 AM    LYMPHSABS 2.91 07/18/2022 11:16 AM    EOSABS 0.04 07/18/2022 11:16 AM    BASOSABS 0.02 07/18/2022 11:16 AM     CMP:    Lab Results   Component Value Date/Time     07/18/2022 11:16 AM    K 3.9 07/18/2022 11:16 AM     07/18/2022 11:16 AM    CO2 21 07/18/2022 11:16 AM    BUN 12 07/18/2022 11:16 AM    CREATININE 0.8 07/18/2022 11:16 AM    GFRAA >60 07/18/2022 11:16 AM    LABGLOM >60 07/18/2022 11:16 AM    GLUCOSE 78 07/30/2020 12:00 AM    PROT 8.6 07/18/2022 11:16 AM    LABALBU 5.1 07/18/2022 11:16 AM    CALCIUM 10.1 07/18/2022 11:16 AM    BILITOT 0.4 07/18/2022 11:16 AM    ALKPHOS 64 07/18/2022 11:16 AM    AST 23 07/18/2022 11:16 AM    ALT 12 07/18/2022 11:16 AM      Keppra level 6 -- July 2022    Assessment:     CPS with secondary generalization   1 reported seizure in July 2022    Breast mass-investigation ongoing    Thrombocytopenia possibly from above however cannot completely exclude Keppra induced     Plan:     Increase Keppra to 750mg BID cautiously given above thrombocytopenia    Call if new difficulties    RTO 4 weeks -if mass proves to be benign would repeat lab work and cautiously watch blood counts   However she may need alternative ASM if her Keppra is the etiology    Michael Dan, DANI - CNS  7:17 AM  8/25/2022

## 2022-08-25 NOTE — TELEPHONE ENCOUNTER
Nurse Rubén Gaines at [facility], [phone] called to schedule an in office visit also for patient. Said Per [employer]. Please call to schedule patient. Patient had a recent seizure 7/29. Thank you!

## 2022-08-31 ENCOUNTER — TELEPHONE (OUTPATIENT)
Dept: PRIMARY CARE CLINIC | Age: 32
End: 2022-08-31

## 2022-08-31 DIAGNOSIS — N63.20 LEFT BREAST MASS: Primary | ICD-10-CM

## 2022-08-31 NOTE — TELEPHONE ENCOUNTER
[facility] called asking to send correct order for US of left breast and sending an order  for mammogram prior that study. I pended both for you the way they wanted it. Please check.     Thanks

## 2022-09-01 DIAGNOSIS — N63.20 LEFT BREAST MASS: Primary | ICD-10-CM

## 2022-09-01 DIAGNOSIS — N63.20 LEFT BREAST MASS: ICD-10-CM

## 2022-09-06 ENCOUNTER — TELEPHONE (OUTPATIENT)
Dept: PRIMARY CARE CLINIC | Age: 32
End: 2022-09-06

## 2022-09-06 DIAGNOSIS — F41.9 ANXIETY: Primary | ICD-10-CM

## 2022-09-06 RX ORDER — LEVOCETIRIZINE DIHYDROCHLORIDE 5 MG/1
TABLET, FILM COATED ORAL
Qty: 16 TABLET | Refills: 2 | Status: SHIPPED | OUTPATIENT
Start: 2022-09-06

## 2022-09-06 RX ORDER — ALPRAZOLAM 0.25 MG/1
0.25 TABLET ORAL 3 TIMES DAILY PRN
Qty: 3 TABLET | Refills: 0 | Status: SHIPPED
Start: 2022-09-06 | End: 2022-10-12 | Stop reason: SDUPTHER

## 2022-09-06 NOTE — TELEPHONE ENCOUNTER
Patients last appointment 7/18/2022.   Patients next scheduled appointment   Future Appointments   Date Time Provider Karley Villalta   9/12/2022 10:15 AM DANI Navarro CNP [facility]   1/20/2023  9:30 AM DANI Navarro CNP [facility]

## 2022-09-12 ENCOUNTER — OFFICE VISIT (OUTPATIENT)
Dept: PRIMARY CARE CLINIC | Age: 32
End: 2022-09-12
Payer: MEDICARE

## 2022-09-12 VITALS
BODY MASS INDEX: 16.2 KG/M2 | WEIGHT: 70 LBS | HEART RATE: 73 BPM | SYSTOLIC BLOOD PRESSURE: 120 MMHG | TEMPERATURE: 98.3 F | RESPIRATION RATE: 17 BRPM | HEIGHT: 55 IN | OXYGEN SATURATION: 94 % | DIASTOLIC BLOOD PRESSURE: 78 MMHG

## 2022-09-12 DIAGNOSIS — N64.59 OTHER SIGNS AND SYMPTOMS IN BREAST: ICD-10-CM

## 2022-09-12 DIAGNOSIS — N63.20 LEFT BREAST MASS: Primary | ICD-10-CM

## 2022-09-12 DIAGNOSIS — N63.21 MASS OF UPPER OUTER QUADRANT OF LEFT BREAST: ICD-10-CM

## 2022-09-12 DIAGNOSIS — N63.0 MASS OF BREAST: ICD-10-CM

## 2022-09-12 PROCEDURE — 99213 OFFICE O/P EST LOW 20 MIN: CPT | Performed by: NURSE PRACTITIONER

## 2022-09-12 PROCEDURE — 1036F TOBACCO NON-USER: CPT | Performed by: NURSE PRACTITIONER

## 2022-09-12 PROCEDURE — G8427 DOCREV CUR MEDS BY ELIG CLIN: HCPCS | Performed by: NURSE PRACTITIONER

## 2022-09-12 PROCEDURE — G8420 CALC BMI NORM PARAMETERS: HCPCS | Performed by: NURSE PRACTITIONER

## 2022-09-12 ASSESSMENT — ENCOUNTER SYMPTOMS
RECTAL PAIN: 0
COLOR CHANGE: 0
BLOOD IN STOOL: 0
ANAL BLEEDING: 0
ABDOMINAL PAIN: 0

## 2022-09-12 NOTE — PROGRESS NOTES
compliance with the treatment plan as well as documenting on the day of the visit. An electronic signature was used to authenticate this note.        --DANI May - CNP

## 2022-09-16 ENCOUNTER — TELEPHONE (OUTPATIENT)
Dept: PRIMARY CARE CLINIC | Age: 32
End: 2022-09-16

## 2022-09-16 DIAGNOSIS — N63.0 MASS OF BREAST: Primary | ICD-10-CM

## 2022-09-16 DIAGNOSIS — N63.20 LEFT BREAST MASS: ICD-10-CM

## 2022-09-16 NOTE — TELEPHONE ENCOUNTER
BOD[facility] requested this issue to be managed just by provider, Nadine Huizar clinic staff (PCP's nurse) and her.

## 2022-09-19 NOTE — TELEPHONE ENCOUNTER
Do you want me to get the patient in soon or? Please let me know, Faraz Estrada has been calling. Thanks.

## 2022-09-20 NOTE — TELEPHONE ENCOUNTER
Referral for Oncology pended for you, they want [place], I found a provider in there, please check. Thanks.

## 2022-10-12 ENCOUNTER — TELEPHONE (OUTPATIENT)
Dept: PRIMARY CARE CLINIC | Age: 32
End: 2022-10-12

## 2022-10-12 DIAGNOSIS — F41.9 ANXIETY: ICD-10-CM

## 2022-10-12 RX ORDER — ALPRAZOLAM 0.5 MG/1
TABLET ORAL
Qty: 6 TABLET | Refills: 0 | Status: SHIPPED | OUTPATIENT
Start: 2022-10-12 | End: 2022-10-26

## 2022-10-12 NOTE — TELEPHONE ENCOUNTER
José Miguel Looney called asking if you can send something for anxiety because patient is having a procedure and xanax 0.5 mg did not help last time. Let me know. Thanks.

## 2022-10-14 DIAGNOSIS — F41.9 ANXIETY: ICD-10-CM

## 2022-10-17 RX ORDER — ALPRAZOLAM 0.5 MG/1
TABLET ORAL
Qty: 6 TABLET | Refills: 0 | OUTPATIENT
Start: 2022-10-17

## 2022-11-01 RX ORDER — POLYETHYLENE GLYCOL 3350 17 G/17G
POWDER ORAL
Qty: 526 G | Refills: 2 | Status: SHIPPED | OUTPATIENT
Start: 2022-11-01

## 2022-11-01 NOTE — TELEPHONE ENCOUNTER
Patients last appointment 9/12/2022.   Patients next scheduled appointment   Future Appointments   Date Time Provider Karley Villalta   1/20/2023  9:30 AM DANI Mohr CNP [facility]

## 2022-11-08 ENCOUNTER — TELEPHONE (OUTPATIENT)
Dept: PRIMARY CARE CLINIC | Age: 32
End: 2022-11-08

## 2022-11-08 DIAGNOSIS — F41.9 ANXIETY: Primary | ICD-10-CM

## 2022-11-08 RX ORDER — ALPRAZOLAM 0.5 MG/1
0.25 TABLET ORAL 2 TIMES DAILY PRN
Qty: 30 TABLET | Refills: 0 | Status: SHIPPED | OUTPATIENT
Start: 2022-11-08 | End: 2022-12-08

## 2022-11-08 NOTE — TELEPHONE ENCOUNTER
Renu Burgos called notifying of the surgery that patient will be having today (left mastectomy) and she was wondering if you can prescribe xanax 0.5 mg BID because she knows pt will be very anxious after procedure. Please let me know what you want to do. Thanks.

## 2022-11-09 NOTE — TELEPHONE ENCOUNTER
Patients last appointment 9/12/2022.   Patients next scheduled appointment   Future Appointments   Date Time Provider Karley Villalta   1/20/2023  9:30 AM DANI Castañeda - CNP [facility]

## 2022-11-10 RX ORDER — ACETAMINOPHEN 160 MG/5ML
LIQUID ORAL
Qty: 240 ML | Refills: 5 | Status: SHIPPED | OUTPATIENT
Start: 2022-11-10

## 2022-11-15 ENCOUNTER — TELEPHONE (OUTPATIENT)
Dept: PRIMARY CARE CLINIC | Age: 32
End: 2022-11-15

## 2022-11-15 NOTE — TELEPHONE ENCOUNTER
Jarocho Bustamante called asking if you can send an order for a hospital bed and a noah lift, for that, pt have to have a face to face visit right? Can she be an exception? Let me know, Thanks.

## 2022-11-16 NOTE — TELEPHONE ENCOUNTER
As you are booking until next year and we do not have space to squeeze patients in,they will try to request the orders with the surgeon.

## 2022-11-25 RX ORDER — HYDROXYZINE HYDROCHLORIDE 25 MG/1
TABLET, FILM COATED ORAL
Qty: 93 TABLET | Refills: 1 | Status: SHIPPED | OUTPATIENT
Start: 2022-11-25

## 2022-11-25 RX ORDER — LEVOCETIRIZINE DIHYDROCHLORIDE 5 MG/1
TABLET, FILM COATED ORAL
Qty: 47 TABLET | Refills: 1 | Status: SHIPPED | OUTPATIENT
Start: 2022-11-25

## 2022-12-01 RX ORDER — LEVETIRACETAM 100 MG/ML
SOLUTION ORAL
Qty: 1500 ML | Refills: 3 | Status: SHIPPED | OUTPATIENT
Start: 2022-12-01

## 2022-12-19 ENCOUNTER — OFFICE VISIT (OUTPATIENT)
Dept: PRIMARY CARE CLINIC | Age: 32
End: 2022-12-19
Payer: MEDICARE

## 2022-12-19 VITALS — RESPIRATION RATE: 22 BRPM | TEMPERATURE: 98.3 F | HEART RATE: 90 BPM | HEIGHT: 55 IN | BODY MASS INDEX: 21.36 KG/M2

## 2022-12-19 DIAGNOSIS — Z00.00 ROUTINE GENERAL MEDICAL EXAMINATION AT A HEALTH CARE FACILITY: ICD-10-CM

## 2022-12-19 DIAGNOSIS — R56.9 SEIZURES (HCC): ICD-10-CM

## 2022-12-19 DIAGNOSIS — G80.2 SPASTIC HEMIPLEGIC CEREBRAL PALSY (HCC): Primary | ICD-10-CM

## 2022-12-19 DIAGNOSIS — L30.9 ECZEMA OF UPPER EXTREMITY: ICD-10-CM

## 2022-12-19 DIAGNOSIS — F51.4 NIGHT TERRORS, ADULT: ICD-10-CM

## 2022-12-19 DIAGNOSIS — G81.91 HEMIPARESIS OF RIGHT DOMINANT SIDE DUE TO NON-CEREBROVASCULAR ETIOLOGY (HCC): ICD-10-CM

## 2022-12-19 DIAGNOSIS — E55.9 VITAMIN D DEFICIENCY: ICD-10-CM

## 2022-12-19 DIAGNOSIS — F79 MENTAL DISABILITY: ICD-10-CM

## 2022-12-19 PROCEDURE — G8420 CALC BMI NORM PARAMETERS: HCPCS | Performed by: NURSE PRACTITIONER

## 2022-12-19 PROCEDURE — 1036F TOBACCO NON-USER: CPT | Performed by: NURSE PRACTITIONER

## 2022-12-19 PROCEDURE — G8427 DOCREV CUR MEDS BY ELIG CLIN: HCPCS | Performed by: NURSE PRACTITIONER

## 2022-12-19 PROCEDURE — G8484 FLU IMMUNIZE NO ADMIN: HCPCS | Performed by: NURSE PRACTITIONER

## 2022-12-19 PROCEDURE — 99214 OFFICE O/P EST MOD 30 MIN: CPT | Performed by: NURSE PRACTITIONER

## 2022-12-19 RX ORDER — TRAMADOL HYDROCHLORIDE 50 MG/1
1 TABLET ORAL DAILY PRN
COMMUNITY
Start: 2022-11-22 | End: 2022-12-19

## 2022-12-19 RX ORDER — TAMOXIFEN CITRATE 20 MG/1
1 TABLET ORAL DAILY
COMMUNITY
Start: 2022-12-08

## 2022-12-19 ASSESSMENT — ENCOUNTER SYMPTOMS
COLOR CHANGE: 0
RECTAL PAIN: 0
ABDOMINAL PAIN: 0
ANAL BLEEDING: 0
BLOOD IN STOOL: 0

## 2022-12-19 NOTE — PROGRESS NOTES
Adrian Fraser (:  1990) is a 34 y.o. female,Established patient, here for evaluation of the following chief complaint(s):  Post-Op Check (Patient here for update in condition, pt refused to have BP taken)         ASSESSMENT/PLAN:  1. Spastic hemiplegic cerebral palsy ([address]  2. Hemiparesis of right dominant side due to non-cerebrovascular etiology (HCC)  3. Seizures (HCC)  4. Eczema of upper extremity  5. Mental disability  6. Routine general medical examination at a health care facility  7. Vitamin D deficiency  8. Night terrors, adult      Return in about 6 months (around 2023). Subjective   SUBJECTIVE/OBJECTIVE:  Lorna Desai is such a pleasant 27-year-old developmentally delayed group home resident of [facility] she presents today with caregivers to Primary care for review of medications and lab evaluation along with management of their chronic medical conditions. Updated current medication list and this was reviewed together. They are tolerating all medications well without adverse events or known side effects reported or noted. They understand the risk and benefits of the prescribed medications. The patient is not up-to-date on all age-appropriate wellness issues but is open to addressing these. Patient denies any reccent hospitalizations or ER visit. No Acute Complaints reported: She is status post left mastectomy doing very well no surgical complications reported      Review of Systems   Constitutional:  Negative for activity change and fever. HENT:  Negative for nosebleeds. Cardiovascular:  Negative for chest pain, palpitations and leg swelling. Gastrointestinal:  Negative for abdominal pain, anal bleeding, blood in stool and rectal pain. Genitourinary:  Negative for hematuria. Skin:  Negative for color change and pallor. Neurological:  Negative for light-headedness and headaches. Hematological:  Does not bruise/bleed easily.         Objective   Physical Exam  Constitutional:       Appearance: Normal appearance. HENT:      Head: Normocephalic. Nose: Nose normal.   Eyes:      Pupils: Pupils are equal, round, and reactive to light. Cardiovascular:      Rate and Rhythm: Normal rate and regular rhythm. Pulmonary:      Effort: Pulmonary effort is normal.   Abdominal:      General: Abdomen is flat. Palpations: Abdomen is soft. Musculoskeletal:         General: Normal range of motion. Skin:     General: Skin is warm and dry. Neurological:      General: No focal deficit present. Mental Status: She is alert. Mental status is at baseline. Psychiatric:         Mood and Affect: Mood normal.          On this date 9/12/2022 I have spent 25 minutes reviewing previous notes, test results and face to face with the patient discussing the diagnosis and importance of compliance with the treatment plan as well as documenting on the day of the visit. An electronic signature was used to authenticate this note.        --DANI Chaparro - CNP

## 2023-01-18 RX ORDER — FLUTICASONE PROPIONATE 50 MCG
SPRAY, SUSPENSION (ML) NASAL
Qty: 16 G | Refills: 5 | Status: SHIPPED | OUTPATIENT
Start: 2023-01-18

## 2023-02-15 ENCOUNTER — TELEPHONE (OUTPATIENT)
Dept: PRIMARY CARE CLINIC | Age: 33
End: 2023-02-15

## 2023-02-15 RX ORDER — DEXTROMETHORPHAN POLISTIREX 30 MG/5ML
SUSPENSION ORAL
Qty: 89 ML | Refills: 5 | Status: SHIPPED | OUTPATIENT
Start: 2023-02-15

## 2023-02-15 NOTE — TELEPHONE ENCOUNTER
Patients last appointment 12/19/2022.   Patients next scheduled appointment   Future Appointments   Date Time Provider Karley Villalta   3/27/2023  2:00 PM DANI Gonzalez CNP [facility]   6/19/2023 10:45 AM DANI Gonzalez CNP [facility]

## 2023-02-15 NOTE — TELEPHONE ENCOUNTER
Braxton Hennessy called asking if PT/OT can be put in the system for [facility], pt was just here in December. I will be sending you a message about what she said about this. Let me know if you will do it. Thanks.

## 2023-02-16 ENCOUNTER — OFFICE VISIT (OUTPATIENT)
Dept: PRIMARY CARE CLINIC | Age: 33
End: 2023-02-16

## 2023-02-16 VITALS
HEART RATE: 91 BPM | OXYGEN SATURATION: 95 % | DIASTOLIC BLOOD PRESSURE: 58 MMHG | WEIGHT: 70 LBS | BODY MASS INDEX: 21.36 KG/M2 | SYSTOLIC BLOOD PRESSURE: 98 MMHG | TEMPERATURE: 97.1 F

## 2023-02-16 DIAGNOSIS — J06.9 VIRAL URI: Primary | ICD-10-CM

## 2023-02-16 DIAGNOSIS — R09.81 NASAL CONGESTION: ICD-10-CM

## 2023-02-16 LAB
Lab: NORMAL
PERFORMING INSTRUMENT: NORMAL
QC PASS/FAIL: NORMAL
SARS-COV-2, POC: NORMAL

## 2023-02-16 RX ORDER — BENZONATATE 100 MG/1
100 CAPSULE ORAL 3 TIMES DAILY PRN
Qty: 30 CAPSULE | Refills: 0 | Status: SHIPPED | OUTPATIENT
Start: 2023-02-16 | End: 2023-02-26
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
## 2023-02-20 ENCOUNTER — OFFICE VISIT (OUTPATIENT)
Dept: PRIMARY CARE CLINIC | Age: 33
End: 2023-02-20

## 2023-02-20 VITALS
HEART RATE: 87 BPM | TEMPERATURE: 98.2 F | SYSTOLIC BLOOD PRESSURE: 104 MMHG | OXYGEN SATURATION: 95 % | DIASTOLIC BLOOD PRESSURE: 64 MMHG

## 2023-02-20 DIAGNOSIS — G81.91 HEMIPARESIS OF RIGHT DOMINANT SIDE DUE TO NON-CEREBROVASCULAR ETIOLOGY (HCC): ICD-10-CM

## 2023-02-20 DIAGNOSIS — R53.83 OTHER FATIGUE: ICD-10-CM

## 2023-02-20 DIAGNOSIS — R56.9 SEIZURES (HCC): ICD-10-CM

## 2023-02-20 DIAGNOSIS — J01.10 ACUTE NON-RECURRENT FRONTAL SINUSITIS: Primary | ICD-10-CM

## 2023-02-20 DIAGNOSIS — R09.81 NASAL CONGESTION: ICD-10-CM

## 2023-02-20 DIAGNOSIS — E43 UNSPECIFIED SEVERE PROTEIN-CALORIE MALNUTRITION (HCC): ICD-10-CM

## 2023-02-20 DIAGNOSIS — G80.2 SPASTIC HEMIPLEGIC CEREBRAL PALSY (HCC): ICD-10-CM

## 2023-02-20 DIAGNOSIS — J34.89 NASAL DRAINAGE: ICD-10-CM

## 2023-02-20 LAB
INFLUENZA A ANTIGEN, POC: NEGATIVE
INFLUENZA B ANTIGEN, POC: NEGATIVE
LOT EXPIRE DATE: NORMAL
LOT KIT NUMBER: NORMAL
SARS-COV-2, POC: NORMAL
VALID INTERNAL CONTROL: NORMAL
VENDOR AND KIT NAME POC: NORMAL

## 2023-02-20 RX ORDER — AMOXICILLIN 400 MG/5ML
800 POWDER, FOR SUSPENSION ORAL 2 TIMES DAILY
Qty: 200 ML | Refills: 0 | Status: SHIPPED | OUTPATIENT
Start: 2023-02-20 | End: 2023-03-02

## 2023-02-20 SDOH — ECONOMIC STABILITY: INCOME INSECURITY: HOW HARD IS IT FOR YOU TO PAY FOR THE VERY BASICS LIKE FOOD, HOUSING, MEDICAL CARE, AND HEATING?: NOT HARD AT ALL

## 2023-02-20 SDOH — ECONOMIC STABILITY: FOOD INSECURITY: WITHIN THE PAST 12 MONTHS, THE FOOD YOU BOUGHT JUST DIDN'T LAST AND YOU DIDN'T HAVE MONEY TO GET MORE.: NEVER TRUE

## 2023-02-20 SDOH — ECONOMIC STABILITY: HOUSING INSECURITY
IN THE LAST 12 MONTHS, WAS THERE A TIME WHEN YOU DID NOT HAVE A STEADY PLACE TO SLEEP OR SLEPT IN A SHELTER (INCLUDING NOW)?: NO

## 2023-02-20 SDOH — ECONOMIC STABILITY: FOOD INSECURITY: WITHIN THE PAST 12 MONTHS, YOU WORRIED THAT YOUR FOOD WOULD RUN OUT BEFORE YOU GOT MONEY TO BUY MORE.: NEVER TRUE

## 2023-02-20 ASSESSMENT — PATIENT HEALTH QUESTIONNAIRE - PHQ9
SUM OF ALL RESPONSES TO PHQ QUESTIONS 1-9: 0
SUM OF ALL RESPONSES TO PHQ9 QUESTIONS 1 & 2: 0
SUM OF ALL RESPONSES TO PHQ QUESTIONS 1-9: 0
2. FEELING DOWN, DEPRESSED OR HOPELESS: 0
1. LITTLE INTEREST OR PLEASURE IN DOING THINGS: 0
SUM OF ALL RESPONSES TO PHQ QUESTIONS 1-9: 0
SUM OF ALL RESPONSES TO PHQ QUESTIONS 1-9: 0

## 2023-02-20 ASSESSMENT — ENCOUNTER SYMPTOMS
COUGH: 1
RECTAL PAIN: 0
RHINORRHEA: 1
ANAL BLEEDING: 0
ABDOMINAL PAIN: 0
COLOR CHANGE: 0
BLOOD IN STOOL: 0

## 2023-02-20 NOTE — PROGRESS NOTES
Mili Granados (:  1990) is a 34 y.o. female,Established patient, here for evaluation of the following chief complaint(s):  Cough (Not sleeping well at night  ), Congestion (Yellow ), and Fatigue (Was just here )         ASSESSMENT/PLAN:  1. Acute non-recurrent frontal sinusitis  2. Nasal congestion  -     POCT COVID-19 & Influenza A/B  3. Nasal drainage  -     POCT COVID-19 & Influenza A/B  4. Other fatigue  -     POCT COVID-19 & Influenza A/B  5. Unspecified severe protein-calorie malnutrition ([address]  6. Hemiparesis of right dominant side due to non-cerebrovascular etiology (HCC)  7. Spastic hemiplegic cerebral palsy ([address]  8. Seizures ([address]      Return if symptoms worsen or fail to improve. Subjective   SUBJECTIVE/OBJECTIVE:  Chief Complaint:  Cough (Not sleeping well at night  ), Congestion (Yellow ), and Fatigue (Was just here )      History of Present Illness  Source of history provided by:  caregiver / friend. Mili Granados is a 34 y.o. old female who presents for care for evaluation of sinus pressure, nasal congestion, discolored nasal drainage, bilateral ear pressure, mild yellow productive cough and sore throat x 6 days. Has been taking tessalon pearls OTC without relief. Denies any fever, chills, wheezing, CP, SOB, or GI symptoms. no any hx of asthma, COPD, or tobacco use. LAST VISIT  Arsalome Barragan is such a pleasant 65-year-old developmentally delayed group home resident of 2 sisters she presents today with caregivers to Primary care for review of medications and lab evaluation along with management of their chronic medical conditions. Updated current medication list and this was reviewed together. They are tolerating all medications well without adverse events or known side effects reported or noted. They understand the risk and benefits of the prescribed medications. The patient is not up-to-date on all age-appropriate wellness issues but is open to addressing these. Patient denies any reccent hospitalizations or ER visit. No Acute Complaints reported: She is status post left mastectomy doing very well no surgical complications reported    [place]  Physician to Follow Referral: Physical therapy and Occupational Therapy treat and eval    I certify that I, or a nurse practitioner or physician assistant working with me, had an in-person encounter with the patient  and the reason for the home care services is documented in the clinical note for that day. The patient was assessed on the above date and was found to have medical conditions requiring Home Care that included     Homebound Status: further, I certify that my clinical findings support that this patient does meet the Medicare definition of \"Confined to the Home\" because of Deconditioned due to medical condition  Unsteady gait or dizziness that requires assistance of wheelchair, walker, or assistance of another person  Postoperative weakness and/or pain restricting activity  Severe confusion, disorientation, or compromised mental state     Certification and medical necessity: I certify that, based on my findings, the following services are medically necessary home health services for  for the following reasons:  - Consult Physical Therapy for  Evaluate and Treat  - Consult Occupational Therapy for  Evaluate and Treat      Review of Systems   Constitutional:  Negative for activity change and fever. HENT:  Positive for postnasal drip and rhinorrhea. Negative for nosebleeds. Respiratory:  Positive for cough. Cardiovascular:  Negative for chest pain, palpitations and leg swelling. Gastrointestinal:  Negative for abdominal pain, anal bleeding, blood in stool and rectal pain. Genitourinary:  Negative for hematuria. Musculoskeletal:  Positive for neck stiffness. Skin:  Negative for color change and pallor. Neurological:  Positive for weakness. Negative for light-headedness and headaches.    Hematological: Does not bruise/bleed easily. Objective   Physical Exam  Constitutional:       Appearance: Normal appearance. HENT:      Head: Normocephalic. Nose: Congestion and rhinorrhea present. Eyes:      Pupils: Pupils are equal, round, and reactive to light. Cardiovascular:      Rate and Rhythm: Normal rate and regular rhythm. Pulmonary:      Effort: Pulmonary effort is normal.   Abdominal:      General: Abdomen is flat. Palpations: Abdomen is soft. Musculoskeletal:         General: Normal range of motion. Skin:     General: Skin is warm and dry. Neurological:      General: No focal deficit present. Mental Status: She is alert. Mental status is at baseline. Motor: Weakness present. Gait: Gait abnormal.   Psychiatric:         Mood and Affect: Mood normal.        NEG FLU AND COVID  On this date 9/12/2022 I have spent 25 minutes reviewing previous notes, test results and face to face with the patient discussing the diagnosis and importance of compliance with the treatment plan as well as documenting on the day of the visit. An electronic signature was used to authenticate this note.        --DANI Garcia - CNP

## 2023-02-20 NOTE — LETTER
[address] [address]  Phone: [phone]  Fax: [phone] [facility] Professional Service Patient Encounter Form  CHIEF COMPLAINT  Cough (Not sleeping well at night  ), Congestion (Yellow ), and Fatigue (Was just here 2/16)     NEW AND DISCONTINUED MEDS THIS VISIT  New Prescriptions    AMOXICILLIN (AMOXIL) 400 MG/5ML SUSPENSION    Take 10 mLs by mouth 2 times daily for 10 days      There are no discontinued medications. Lab / Imaging Results   Labs:  FLU AND STREP NEGATIVE  Imaging: All Radiology results interpreted by Radiologist unless otherwise noted. No results found. Diet: No changes  Labs: Routine  Restrictions: None  Next Appt with PCP:  3/27/2023   Guardian Notified: At Appt   SSA Notified:  At 0930 12/16/2022    DANI Tinsley - ARLYN
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
## 2023-05-01 ENCOUNTER — OFFICE VISIT (OUTPATIENT)
Dept: PRIMARY CARE CLINIC | Age: 33
End: 2023-05-01
Payer: MEDICARE

## 2023-05-01 VITALS
RESPIRATION RATE: 20 BRPM | WEIGHT: 70 LBS | HEART RATE: 78 BPM | TEMPERATURE: 98 F | HEIGHT: 55 IN | BODY MASS INDEX: 16.2 KG/M2 | OXYGEN SATURATION: 96 %

## 2023-05-01 DIAGNOSIS — G81.91 RIGHT HEMIPARESIS (HCC): ICD-10-CM

## 2023-05-01 DIAGNOSIS — G81.91 HEMIPARESIS OF RIGHT DOMINANT SIDE DUE TO NON-CEREBROVASCULAR ETIOLOGY (HCC): Primary | ICD-10-CM

## 2023-05-01 DIAGNOSIS — R56.9 SEIZURES (HCC): ICD-10-CM

## 2023-05-01 DIAGNOSIS — G80.2 SPASTIC HEMIPLEGIC CEREBRAL PALSY (HCC): ICD-10-CM

## 2023-05-01 DIAGNOSIS — E43 UNSPECIFIED SEVERE PROTEIN-CALORIE MALNUTRITION (HCC): ICD-10-CM

## 2023-05-01 DIAGNOSIS — J30.2 SEASONAL ALLERGIES: ICD-10-CM

## 2023-05-01 PROCEDURE — 1036F TOBACCO NON-USER: CPT | Performed by: NURSE PRACTITIONER

## 2023-05-01 PROCEDURE — G8420 CALC BMI NORM PARAMETERS: HCPCS | Performed by: NURSE PRACTITIONER

## 2023-05-01 PROCEDURE — 99214 OFFICE O/P EST MOD 30 MIN: CPT | Performed by: NURSE PRACTITIONER

## 2023-05-01 PROCEDURE — G8427 DOCREV CUR MEDS BY ELIG CLIN: HCPCS | Performed by: NURSE PRACTITIONER

## 2023-05-01 RX ORDER — NERATINIB 40 MG/1
3 TABLET ORAL DAILY
COMMUNITY
Start: 2023-04-17

## 2023-05-01 RX ORDER — CETIRIZINE HYDROCHLORIDE 10 MG/1
10 TABLET ORAL DAILY
Qty: 30 TABLET | Refills: 5 | Status: SHIPPED | OUTPATIENT
Start: 2023-05-01

## 2023-05-01 ASSESSMENT — ENCOUNTER SYMPTOMS
COLOR CHANGE: 0
RECTAL PAIN: 0
BLOOD IN STOOL: 0
ABDOMINAL PAIN: 0
ANAL BLEEDING: 0

## 2023-05-01 NOTE — PROGRESS NOTES
Valeri Galo (:  1990) is a [address] y.o. female,Established patient, here for evaluation of the following chief complaint(s): Allergic Rhinitis  (6 months check up, no labs, patient having bad allergies, Ricka Stairs wondering if xyzal can be increased or changed, they are wondering if you can write xanax and zyrtec D/C in the paper they brought)         ASSESSMENT/PLAN:  1. Hemiparesis of right dominant side due to non-cerebrovascular etiology (HCC)  2. Spastic hemiplegic cerebral palsy (HCC)  3. Seizures ([facility]  4. Unspecified severe protein-calorie malnutrition ([facility]  5. Right hemiparesis ([facility]  6. Seasonal allergies        Return in about 6 months (around 2023) for Routine Visit with Labs. Subjective   SUBJECTIVE/OBJECTIVE:      LAST VISIT  Lux Burk is such a pleasant 23-year-old developmentally delayed group home resident of 2 sisters she presents today with caregivers to Primary care for review of medications and lab evaluation along with management of their chronic medical conditions. Updated current medication list and this was reviewed together. They are tolerating all medications well without adverse events or known side effects reported or noted. They understand the risk and benefits of the prescribed medications. The patient is not up-to-date on all age-appropriate wellness issues but is open to addressing these. Patient denies any reccent hospitalizations or ER visit. No Acute Complaints reported: She is status post left mastectomy doing very well no surgical complications reported    [place]  Physician to Follow Referral: Physical therapy and Occupational Therapy treat and eval    I certify that I, or a nurse practitioner or physician assistant working with me, had an in-person encounter with the patient  and the reason for the home care services is documented in the clinical note for that day.       The patient was assessed on the above date and was found to have medical conditions

## 2023-06-01 RX ORDER — HYDROXYZINE HYDROCHLORIDE 25 MG/1
TABLET, FILM COATED ORAL
Qty: 93 TABLET | Refills: 0 | Status: SHIPPED | OUTPATIENT
Start: 2023-06-01

## 2023-06-01 NOTE — TELEPHONE ENCOUNTER
Refill    Patients last appointment 5/1/2023.   Patients next scheduled appointment   Future Appointments   Date Time Provider Karley Villalta   11/6/2023 10:00 AM DANI Alvarez - CNP [facility]

## 2023-08-22 RX ORDER — TRIAMCINOLONE ACETONIDE 1 MG/G
CREAM TOPICAL
Qty: 80 G | Refills: 5 | Status: SHIPPED | OUTPATIENT
Start: 2023-08-22

## 2023-08-22 NOTE — TELEPHONE ENCOUNTER
Patients last appointment 5/1/2023.   Patients next scheduled appointment   Future Appointments   Date Time Provider [address]   11/6/2023 10:00 AM DANI Krishna - CNP [facility]

## 2023-09-05 DIAGNOSIS — F41.9 ANXIETY: Primary | ICD-10-CM

## 2023-09-05 RX ORDER — HYDROXYZINE HYDROCHLORIDE 25 MG/1
TABLET, FILM COATED ORAL
Qty: 93 TABLET | Refills: 1 | Status: SHIPPED | OUTPATIENT
Start: 2023-09-05

## 2023-09-05 NOTE — TELEPHONE ENCOUNTER
Patients last appointment 5/1/2023.   Patients next scheduled appointment   Future Appointments   Date Time Provider [address]   11/6/2023 10:00 AM DANI Enciso CNP [facility]

## 2023-09-28 RX ORDER — POLYETHYLENE GLYCOL 3350 17 G/17G
POWDER ORAL
Qty: 526 G | Refills: 1 | Status: SHIPPED | OUTPATIENT
Start: 2023-09-28

## 2023-09-28 NOTE — TELEPHONE ENCOUNTER
Patients last appointment 5/1/2023.   Patients next scheduled appointment   Future Appointments   Date Time Provider [address]   11/13/2023 10:45 AM DANI Huang - CNP [facility]

## 2023-10-11 RX ORDER — CETIRIZINE HYDROCHLORIDE 10 MG/1
10 TABLET ORAL DAILY
Qty: 30 TABLET | Refills: 5 | Status: SHIPPED | OUTPATIENT
Start: 2023-10-11

## 2023-10-11 NOTE — TELEPHONE ENCOUNTER
Patients last appointment 5/1/2023.   Patients next scheduled appointment   Future Appointments   Date Time Provider [address]   11/13/2023 10:45 AM DANI Lee - CNP [facility]

## 2023-11-08 RX ORDER — LEVETIRACETAM 100 MG/ML
SOLUTION ORAL
Qty: 450 ML | Refills: 3 | Status: SHIPPED | OUTPATIENT
Start: 2023-11-08

## 2023-11-13 ENCOUNTER — OFFICE VISIT (OUTPATIENT)
Dept: PRIMARY CARE CLINIC | Age: 33
End: 2023-11-13

## 2023-11-13 VITALS
WEIGHT: 67 LBS | HEIGHT: 55 IN | OXYGEN SATURATION: 95 % | SYSTOLIC BLOOD PRESSURE: 100 MMHG | BODY MASS INDEX: 15.51 KG/M2 | HEART RATE: 100 BPM | RESPIRATION RATE: 16 BRPM | DIASTOLIC BLOOD PRESSURE: 68 MMHG | TEMPERATURE: 98.3 F

## 2023-11-13 DIAGNOSIS — G80.2 SPASTIC HEMIPLEGIC CEREBRAL PALSY (HCC): ICD-10-CM

## 2023-11-13 DIAGNOSIS — F41.9 ANXIETY: ICD-10-CM

## 2023-11-13 DIAGNOSIS — G81.91 RIGHT HEMIPARESIS (HCC): ICD-10-CM

## 2023-11-13 DIAGNOSIS — F79 MENTAL DISABILITY: ICD-10-CM

## 2023-11-13 DIAGNOSIS — Z00.00 ROUTINE GENERAL MEDICAL EXAMINATION AT A HEALTH CARE FACILITY: ICD-10-CM

## 2023-11-13 DIAGNOSIS — Z13.220 SCREENING FOR LIPOID DISORDERS: ICD-10-CM

## 2023-11-13 DIAGNOSIS — D69.6 THROMBOCYTOPENIA, UNSPECIFIED (HCC): ICD-10-CM

## 2023-11-13 DIAGNOSIS — Z13.29 SCREENING FOR THYROID DISORDER: ICD-10-CM

## 2023-11-13 DIAGNOSIS — E43 UNSPECIFIED SEVERE PROTEIN-CALORIE MALNUTRITION (HCC): ICD-10-CM

## 2023-11-13 DIAGNOSIS — E55.9 VITAMIN D DEFICIENCY: ICD-10-CM

## 2023-11-13 DIAGNOSIS — R56.9 SEIZURES (HCC): Primary | ICD-10-CM

## 2023-11-13 PROBLEM — Z85.3 HISTORY OF MALIGNANT NEOPLASM OF BREAST: Status: ACTIVE | Noted: 2022-10-18

## 2023-11-13 PROBLEM — Z90.12 ACQUIRED ABSENCE OF LEFT BREAST AND NIPPLE: Status: ACTIVE | Noted: 2023-03-22

## 2023-11-13 RX ORDER — TACROLIMUS 1 MG/G
OINTMENT TOPICAL
COMMUNITY
Start: 2023-09-14

## 2023-11-13 ASSESSMENT — ENCOUNTER SYMPTOMS
ABDOMINAL PAIN: 0
BLOOD IN STOOL: 0
RECTAL PAIN: 0
ANAL BLEEDING: 0
COLOR CHANGE: 0

## 2023-11-13 NOTE — PROGRESS NOTES
Geovanny Quispe (:  1990) is a 28 y.o. female,Established patient, here for evaluation of the following chief complaint(s):  Consultation (6 months check up, no labs, no health issues checked)         ASSESSMENT/PLAN:  1. Seizures ([address])  -     CBC with Auto Differential; Future  -     Comprehensive Metabolic Panel, Fasting; Future  -     Urinalysis; Future  2. Unspecified severe protein-calorie malnutrition ([address])  3. Right hemiparesis (HCC)  4. Spastic hemiplegic cerebral palsy ([address])  5. Anxiety  6. Mental disability  7. Routine general medical examination at a health care facility  -     CBC with Auto Differential; Future  -     Comprehensive Metabolic Panel, Fasting; Future  -     Urinalysis; Future  8. Vitamin D deficiency  9. Screening for thyroid disorder  -     TSH; Future  10. Screening for lipoid disorders  -     Lipid Panel; Future  11. Thrombocytopenia, unspecified ([address])        No follow-ups on file. Subjective   SUBJECTIVE/OBJECTIVE:  Mei Marrero is a very pleasant 28 y.o. female  presents today to Primary care for review of medications and lab evaluation along with management of their chronic medical conditions. Updated current medication list and this was reviewed together. They are tolerating all medications well without adverse events or known side effects reported or noted. They understand the risk and benefits of the prescribed medications. The patient is not up-to-date on all age-appropriate wellness issues but is open to addressing these. Patient denies any reccent hospitalizations or ER visit. No Acute Complaints reported:    LAST VISIT  Mei Mrarero is such a pleasant 28-year-old developmentally delayed group home resident of 2 sisters she presents today with caregivers to Primary care for review of medications and lab evaluation along with management of their chronic medical conditions. Updated current medication list and this was reviewed together.  They are tolerating all

## 2023-12-08 LAB
ALBUMIN SERPL-MCNC: NORMAL G/DL
ALP BLD-CCNC: NORMAL U/L
ALT SERPL-CCNC: NORMAL U/L
AST SERPL-CCNC: NORMAL U/L
BASOPHILS ABSOLUTE: NORMAL
BASOPHILS RELATIVE PERCENT: NORMAL
BILIRUB SERPL-MCNC: NORMAL MG/DL
BUN BLDV-MCNC: NORMAL MG/DL
CALCIUM SERPL-MCNC: NORMAL MG/DL
CHLORIDE BLD-SCNC: NORMAL MMOL/L
CHOLESTEROL, TOTAL: 172 MG/DL
CHOLESTEROL/HDL RATIO: 2
CO2: NORMAL
CREAT SERPL-MCNC: NORMAL MG/DL
EOSINOPHILS ABSOLUTE: NORMAL
EOSINOPHILS RELATIVE PERCENT: NORMAL
GLUCOSE FASTING: NORMAL
HCT VFR BLD CALC: NORMAL %
HDLC SERPL-MCNC: 86 MG/DL (ref 35–70)
HEMOGLOBIN: NORMAL
LDL CHOLESTEROL CALCULATED: 77 MG/DL (ref 0–160)
LYMPHOCYTES ABSOLUTE: NORMAL
LYMPHOCYTES RELATIVE PERCENT: NORMAL
MCH RBC QN AUTO: NORMAL PG
MCHC RBC AUTO-ENTMCNC: NORMAL G/DL
MCV RBC AUTO: NORMAL FL
MONOCYTES ABSOLUTE: NORMAL
MONOCYTES RELATIVE PERCENT: NORMAL
NEUTROPHILS ABSOLUTE: NORMAL
NEUTROPHILS RELATIVE PERCENT: NORMAL
NONHDLC SERPL-MCNC: ABNORMAL MG/DL
PDW BLD-RTO: NORMAL %
PLATELET # BLD: NORMAL 10*3/UL
PMV BLD AUTO: NORMAL FL
POTASSIUM SERPL-SCNC: NORMAL MMOL/L
RBC # BLD: NORMAL 10*6/UL
SODIUM BLD-SCNC: NORMAL MMOL/L
TOTAL PROTEIN: NORMAL
TRIGL SERPL-MCNC: 48 MG/DL
TSH SERPL DL<=0.05 MIU/L-ACNC: 1.55 UIU/ML
VITAMIN D 25-HYDROXY: 7.9
VITAMIN D2, 25 HYDROXY: ABNORMAL
VITAMIN D3,25 HYDROXY: ABNORMAL
VLDLC SERPL CALC-MCNC: 10 MG/DL
WBC # BLD: NORMAL 10*3/UL

## 2023-12-11 DIAGNOSIS — Z00.00 ROUTINE GENERAL MEDICAL EXAMINATION AT A HEALTH CARE FACILITY: ICD-10-CM

## 2023-12-11 DIAGNOSIS — Z13.29 SCREENING FOR THYROID DISORDER: ICD-10-CM

## 2023-12-11 DIAGNOSIS — Z13.220 SCREENING FOR LIPOID DISORDERS: ICD-10-CM

## 2023-12-11 DIAGNOSIS — R56.9 SEIZURES (HCC): ICD-10-CM

## 2023-12-11 DIAGNOSIS — E55.9 VITAMIN D DEFICIENCY: ICD-10-CM

## 2023-12-11 NOTE — RESULT ENCOUNTER NOTE
REVIEWED   She needs vitamin D I would say 5000 the day otherwise labs look good    PLEASE CALL PATIENT WITH RESULTS

## 2023-12-15 RX ORDER — ERGOCALCIFEROL 1.25 MG/1
50000 CAPSULE ORAL WEEKLY
Qty: 12 CAPSULE | Refills: 1 | Status: SHIPPED | OUTPATIENT
Start: 2023-12-15

## 2024-02-15 RX ORDER — LEVETIRACETAM 100 MG/ML
SOLUTION ORAL
Qty: 473 ML | Refills: 0 | Status: SHIPPED | OUTPATIENT
Start: 2024-02-15

## 2024-03-19 DIAGNOSIS — F41.9 ANXIETY: ICD-10-CM

## 2024-03-19 RX ORDER — LEVETIRACETAM 100 MG/ML
SOLUTION ORAL
Qty: 1395 ML | Refills: 0 | Status: SHIPPED | OUTPATIENT
Start: 2024-03-19

## 2024-03-19 RX ORDER — CETIRIZINE HYDROCHLORIDE 10 MG/1
10 TABLET ORAL DAILY
Qty: 93 TABLET | Refills: 1 | Status: SHIPPED | OUTPATIENT
Start: 2024-03-19

## 2024-03-19 RX ORDER — HYDROXYZINE HYDROCHLORIDE 25 MG/1
TABLET, FILM COATED ORAL
Qty: 93 TABLET | Refills: 1 | Status: SHIPPED | OUTPATIENT
Start: 2024-03-19

## 2024-04-02 RX ORDER — FLUTICASONE PROPIONATE 50 MCG
SPRAY, SUSPENSION (ML) NASAL
Qty: 16 G | Refills: 0 | Status: SHIPPED | OUTPATIENT
Start: 2024-04-02

## 2024-04-26 RX ORDER — ERGOCALCIFEROL 1.25 MG/1
50000 CAPSULE ORAL WEEKLY
Qty: 12 CAPSULE | Refills: 1 | Status: SHIPPED | OUTPATIENT
Start: 2024-04-26

## 2024-04-26 RX ORDER — TAMOXIFEN CITRATE 20 MG/1
20 TABLET ORAL DAILY
Qty: 31 TABLET | Refills: 5 | Status: SHIPPED | OUTPATIENT
Start: 2024-04-26

## 2024-04-26 RX ORDER — FLUTICASONE PROPIONATE 50 MCG
SPRAY, SUSPENSION (ML) NASAL
Qty: 16 G | Refills: 5 | Status: SHIPPED | OUTPATIENT
Start: 2024-04-26

## 2024-08-09 DIAGNOSIS — F41.9 ANXIETY: ICD-10-CM

## 2024-08-12 RX ORDER — HYDROXYZINE HYDROCHLORIDE 25 MG/1
TABLET, FILM COATED ORAL
Qty: 30 TABLET | Refills: 11 | Status: SHIPPED | OUTPATIENT
Start: 2024-08-12

## 2024-09-13 RX ORDER — CETIRIZINE HYDROCHLORIDE 10 MG/1
TABLET ORAL
Qty: 30 TABLET | Refills: 11 | Status: SHIPPED | OUTPATIENT
Start: 2024-09-13

## 2024-11-08 RX ORDER — ERGOCALCIFEROL 1.25 MG/1
CAPSULE, LIQUID FILLED ORAL
Qty: 4 CAPSULE | Refills: 5 | Status: SHIPPED | OUTPATIENT
Start: 2024-11-08

## 2024-11-08 RX ORDER — TAMOXIFEN CITRATE 20 MG/1
TABLET ORAL
Qty: 30 TABLET | Refills: 5 | Status: SHIPPED | OUTPATIENT
Start: 2024-11-08

## 2024-11-08 NOTE — TELEPHONE ENCOUNTER
Name of Medication(s) Requested:  Requested Prescriptions     Pending Prescriptions Disp Refills    vitamin D (ERGOCALCIFEROL) 1.25 MG (48040 UT) CAPS capsule [Pharmacy Med Name: VIT D2 1.25 MG (50,000 UNIT)] 4 capsule 11     Si CAPSULE BY MOUTH EVERY WEEK ON THURSDAY    tamoxifen (NOLVADEX) 20 MG tablet [Pharmacy Med Name: TAMOXIFEN 20 MG TABLET] 30 tablet 11     Si TABLET BY MOUTH DAILY       Medication is on current medication list Yes    Dosage and directions were verified? Yes    Quantity verified: 30 day supply     Pharmacy Verified?  Yes    Last Appointment:  2023    Future appts:  Future Appointments   Date Time Provider Department Center   2024 10:30 AM Mannie Quarles APRN - CNP [facility]        (If no appt send self scheduling link. .REFILLAPPT)  Scheduling request sent?     [] Yes  [x] No    Does patient need updated?  [] Yes  [x] No

## 2024-11-13 ENCOUNTER — OFFICE VISIT (OUTPATIENT)
Dept: PRIMARY CARE CLINIC | Age: 34
End: 2024-11-13

## 2024-11-13 ENCOUNTER — OFFICE VISIT (OUTPATIENT)
Dept: PRIMARY CARE CLINIC | Age: 34
End: 2024-11-13
Payer: MEDICARE

## 2024-11-13 VITALS
HEIGHT: 55 IN | BODY MASS INDEX: 18.01 KG/M2 | DIASTOLIC BLOOD PRESSURE: 62 MMHG | TEMPERATURE: 97.8 F | OXYGEN SATURATION: 94 % | SYSTOLIC BLOOD PRESSURE: 95 MMHG | RESPIRATION RATE: 18 BRPM | WEIGHT: 77.8 LBS | HEART RATE: 84 BPM

## 2024-11-13 VITALS
HEIGHT: 55 IN | BODY MASS INDEX: 17.93 KG/M2 | WEIGHT: 77.5 LBS | TEMPERATURE: 97.8 F | HEART RATE: 84 BPM | SYSTOLIC BLOOD PRESSURE: 95 MMHG | DIASTOLIC BLOOD PRESSURE: 62 MMHG | OXYGEN SATURATION: 94 %

## 2024-11-13 DIAGNOSIS — R56.9 SEIZURES (HCC): ICD-10-CM

## 2024-11-13 DIAGNOSIS — Z13.29 SCREENING FOR THYROID DISORDER: ICD-10-CM

## 2024-11-13 DIAGNOSIS — Z00.00 INITIAL MEDICARE ANNUAL WELLNESS VISIT: Primary | ICD-10-CM

## 2024-11-13 DIAGNOSIS — E43 UNSPECIFIED SEVERE PROTEIN-CALORIE MALNUTRITION (HCC): ICD-10-CM

## 2024-11-13 DIAGNOSIS — G80.2 SPASTIC HEMIPLEGIC CEREBRAL PALSY (HCC): ICD-10-CM

## 2024-11-13 DIAGNOSIS — F79 MENTAL DISABILITY: ICD-10-CM

## 2024-11-13 DIAGNOSIS — D69.6 THROMBOCYTOPENIA, UNSPECIFIED (HCC): ICD-10-CM

## 2024-11-13 DIAGNOSIS — G81.91 RIGHT HEMIPARESIS (HCC): ICD-10-CM

## 2024-11-13 DIAGNOSIS — Z13.220 SCREENING FOR LIPOID DISORDERS: Primary | ICD-10-CM

## 2024-11-13 PROCEDURE — G8484 FLU IMMUNIZE NO ADMIN: HCPCS | Performed by: NURSE PRACTITIONER

## 2024-11-13 PROCEDURE — G0438 PPPS, INITIAL VISIT: HCPCS | Performed by: NURSE PRACTITIONER

## 2024-11-13 SDOH — ECONOMIC STABILITY: FOOD INSECURITY
WITHIN THE PAST 12 MONTHS, THE FOOD YOU BOUGHT JUST DIDN'T LAST AND YOU DIDN'T HAVE MONEY TO GET MORE.: PATIENT UNABLE TO ANSWER

## 2024-11-13 SDOH — ECONOMIC STABILITY: FOOD INSECURITY
WITHIN THE PAST 12 MONTHS, YOU WORRIED THAT YOUR FOOD WOULD RUN OUT BEFORE YOU GOT MONEY TO BUY MORE.: PATIENT UNABLE TO ANSWER

## 2024-11-13 SDOH — ECONOMIC STABILITY: INCOME INSECURITY
HOW HARD IS IT FOR YOU TO PAY FOR THE VERY BASICS LIKE FOOD, HOUSING, MEDICAL CARE, AND HEATING?: PATIENT UNABLE TO ANSWER

## 2024-11-13 ASSESSMENT — ENCOUNTER SYMPTOMS
BLOOD IN STOOL: 0
COLOR CHANGE: 0
ABDOMINAL PAIN: 0
ANAL BLEEDING: 0
RECTAL PAIN: 0

## 2024-11-13 ASSESSMENT — PATIENT HEALTH QUESTIONNAIRE - PHQ9
1. LITTLE INTEREST OR PLEASURE IN DOING THINGS: NOT AT ALL
2. FEELING DOWN, DEPRESSED OR HOPELESS: NOT AT ALL
SUM OF ALL RESPONSES TO PHQ QUESTIONS 1-9: 0
SUM OF ALL RESPONSES TO PHQ9 QUESTIONS 1 & 2: 0
SUM OF ALL RESPONSES TO PHQ9 QUESTIONS 1 & 2: 0
SUM OF ALL RESPONSES TO PHQ QUESTIONS 1-9: 0
SUM OF ALL RESPONSES TO PHQ QUESTIONS 1-9: 0
1. LITTLE INTEREST OR PLEASURE IN DOING THINGS: NOT AT ALL
2. FEELING DOWN, DEPRESSED OR HOPELESS: NOT AT ALL
SUM OF ALL RESPONSES TO PHQ QUESTIONS 1-9: 0

## 2024-11-13 ASSESSMENT — LIFESTYLE VARIABLES
HOW OFTEN DO YOU HAVE A DRINK CONTAINING ALCOHOL: PATIENT UNABLE TO ANSWER
HOW MANY STANDARD DRINKS CONTAINING ALCOHOL DO YOU HAVE ON A TYPICAL DAY: PATIENT UNABLE TO ANSWER

## 2024-11-13 NOTE — PROGRESS NOTES
medications. she  denies any adverse events. Risk and benefits of the prescribed medications will be addressed. Patient denies any recent hospitalizations or ER visit.      No Acute or Chronic Complaints reported:    Annual Exam (Yearly  check up. No problems at this time )    No Acute Complaints reported: She is status post left mastectomy doing very well no surgical complications reported        Review of Systems   Constitutional:  Negative for activity change and fever.   HENT:  Negative for nosebleeds.    Cardiovascular:  Negative for chest pain, palpitations and leg swelling.   Gastrointestinal:  Negative for abdominal pain, anal bleeding, blood in stool and rectal pain.   Genitourinary:  Negative for hematuria.   Musculoskeletal:  Positive for gait problem and neck stiffness.   Skin:  Negative for color change and pallor.   Neurological:  Positive for weakness. Negative for light-headedness and headaches.   Hematological:  Does not bruise/bleed easily.          Objective   Physical Exam  Vitals and nursing note reviewed.   Constitutional:       Appearance: Normal appearance.   HENT:      Head: Normocephalic.      Right Ear: Tympanic membrane and ear canal normal. There is no impacted cerumen.      Left Ear: Tympanic membrane and ear canal normal. There is no impacted cerumen.      Nose: Nose normal.      Mouth/Throat:      Mouth: Mucous membranes are dry.   Eyes:      Extraocular Movements: Extraocular movements intact.      Pupils: Pupils are equal, round, and reactive to light.   Neck:      Vascular: No carotid bruit.   Cardiovascular:      Rate and Rhythm: Normal rate and regular rhythm.      Pulses: Normal pulses.      Heart sounds: Normal heart sounds. No murmur heard.     No friction rub. No gallop.   Pulmonary:      Effort: Pulmonary effort is normal. No respiratory distress.      Breath sounds: Normal breath sounds. No stridor. No wheezing, rhonchi or rales.   Chest:      Chest wall: No tenderness.

## 2024-11-13 NOTE — PROGRESS NOTES
Medicare Annual Wellness Visit    Brandon Calix is here for Medicare AWV (Awv )    Assessment & Plan   Initial Medicare annual wellness visit  Mental disability  Seizures (HCC)    Recommendations for Preventive Services Due: see orders and patient instructions/AVS.  Recommended screening schedule for the next 5-10 years is provided to the patient in written form: see Patient Instructions/AVS.     Return for Medicare Annual Wellness Visit in 1 year.     Subjective   The following acute and/or chronic problems were also addressed today:  Fatigue    Patient's complete Health Risk Assessment and screening values have been reviewed and are found in Flowsheets. The following problems were reviewed today and where indicated follow up appointments were made and/or referrals ordered.    Positive Risk Factor Screenings with Interventions:              Inactivity:  On average, how many days per week do you engage in moderate to strenuous exercise (like a brisk walk)?: 0 days (!) Abnormal  On average, how many minutes do you engage in exercise at this level?: 0 min  Interventions:  See AVS for additional education material      Dentist Screen:  Have you seen the dentist within the past year?: (!) No    Intervention:  See AVS for additional education material       ADL's:   Patient reports needing help with:  Select all that apply: (!) Dressing, Grooming, Bathing, Toileting, Walking/Balance  Select all that apply: (!) Laundry, Taking Medications, Housekeeping, Banking/Finances, Shopping, Telephone Use, Food Preparation, Transportation  Interventions:  See AVS for additional education material    Advanced Directives:  Do you have a Living Will?: (!) No    Intervention:  has NO advanced directive - information provided    Advance Care Planning   Discussed the patient’s choices for care and treatment preferences in case of a health event that adversely affects decision-making abilities or is life-limiting. Recommended the patient

## 2024-12-13 RX ORDER — BROMPHENIRAMINE MALEATE, PSEUDOEPHEDRINE HYDROCHLORIDE, AND DEXTROMETHORPHAN HYDROBROMIDE 2; 30; 10 MG/5ML; MG/5ML; MG/5ML
SYRUP ORAL
Qty: 118 ML | Refills: 11 | Status: SHIPPED | OUTPATIENT
Start: 2024-12-13

## 2024-12-13 NOTE — TELEPHONE ENCOUNTER
Refill  last appt   11/13/24    Name of Medication(s) Requested:  Requested Prescriptions     Pending Prescriptions Disp Refills    brompheniramine-pseudoephedrine-DM 2-30-10 MG/5ML syrup [Pharmacy Med Name: [id number] SYR] 118 mL 11     Sig: TAKE 1/2 TEASPOON (2.5ML) BY MOUTH THREE TIMES DAILY AS NEEDED FOR CONGESTION.       Medication is on current medication list Yes    Dosage and directions were verified? Yes    Quantity verified: 30 day supply     Pharmacy Verified?  Yes    Last Appointment:  11/13/2024    Future appts:  No future appointments.     (If no appt send self scheduling link. .REFILLAPPT)  Scheduling request sent?     [x] Yes  [] No    Does patient need updated?  [] Yes  [x] No

## 2024-12-18 ENCOUNTER — TELEPHONE (OUTPATIENT)
Dept: NEUROLOGY | Age: 34
End: 2024-12-18

## 2024-12-18 NOTE — TELEPHONE ENCOUNTER
Two sisters called in today to schedule a follow up appt with [facility] in [place]. Best call back is [phone]. Thanks!

## 2025-01-02 RX ORDER — DEXTROMETHORPHAN POLISTIREX 30 MG/5ML
60 SUSPENSION ORAL 2 TIMES DAILY PRN
Qty: 89 ML | Refills: 5 | Status: SHIPPED | OUTPATIENT
Start: 2025-01-02 | End: 2025-01-29

## 2025-01-02 NOTE — TELEPHONE ENCOUNTER
This order was faxed over by pharmacy. .  Is she to be on  this?  I see other cough med on her med list     Name of Medication(s) Requested:  Requested Prescriptions     Pending Prescriptions Disp Refills    dextromethorphan (DELSYM) 30 MG/5ML extended release liquid 89 mL 5     Sig: Take 10 mLs by mouth 2 times daily as needed for Cough       Medication is on current medication listd No    Dosage and directions were verified? Yes    Quantity verified: 30 day supply     Pharmacy Verified?  Yes    Last Appointment:  11/13/2024    Future appts:  Future Appointments   Date Time Provider Department Center   2/5/2025  9:40 AM Kvng Wen, DANI - CNS [facility] Neuro Neurology -        (If no appt send self scheduling link. .REFILLAPPT)  Scheduling request sent?     [] Yes  [x] No    Does patient need updated?  [] Yes  [] No

## 2025-01-09 LAB
CHOLESTEROL, TOTAL: 190 MCG/DL (ref 0–200)
HDLC SERPL-MCNC: 86 MG/DL (ref 40–60)
LDL CHOLESTEROL: 92 MG/DL (ref 0–130)
TRIGL SERPL-MCNC: 62 MG/DL (ref 0–150)
TSH SERPL DL<=0.05 MIU/L-ACNC: 1.39 MIU/ML (ref 0.36–3.74)

## 2025-02-05 ENCOUNTER — OFFICE VISIT (OUTPATIENT)
Dept: NEUROLOGY | Age: 35
End: 2025-02-05
Payer: MEDICARE

## 2025-02-05 VITALS — HEIGHT: 55 IN | WEIGHT: 70 LBS | RESPIRATION RATE: 20 BRPM | BODY MASS INDEX: 16.2 KG/M2

## 2025-02-05 DIAGNOSIS — R56.9 SEIZURES (HCC): Primary | ICD-10-CM

## 2025-02-05 PROCEDURE — 99214 OFFICE O/P EST MOD 30 MIN: CPT | Performed by: CLINICAL NURSE SPECIALIST

## 2025-02-05 RX ORDER — LEVETIRACETAM 750 MG/1
750 TABLET ORAL 2 TIMES DAILY
Qty: 60 TABLET | Refills: 11 | Status: SHIPPED | OUTPATIENT
Start: 2025-02-05

## 2025-02-05 RX ORDER — FERROUS GLUCONATE 324(38)MG
324 TABLET ORAL
COMMUNITY
Start: 2025-02-01

## 2025-02-05 NOTE — PROGRESS NOTES
Brandon Calix is a 34 y.o.female     Long history of seizure disorder   Follows annually since 2013    previously seeing [facility]    Patient suffered 1 seizure last month    Maintained on Keppra 750mg BID   Tolerating well --caregiver did ask to change from liquid to pill form    ROS limited d/t patient condition     Allergies as of 02/05/2025 - Fully Reviewed 02/05/2025   Allergen Reaction Noted    No known allergies  09/23/2020     Objective:     Resp 20   Ht 1.194 m (3' 11\")   Wt 31.8 kg (70 lb)   LMP 11/05/2024 (Approximate)   BMI 22.28 kg/m²   Afebrile      General appearance: alert, cooperative and appears younger than stated age  Head: atraumatic, microcephalic  Extremities: no cyanosis or edema  Pulses: 2+ and symmetric  Skin:  No rashes or lesions     Mental Status: awake, nonverbal, not following commands    Cranial Nerves:  I: smell    II: visual acuity     II: visual fields Bilateral threat   II: pupils MERCEDES   III,VII: ptosis    III,IV,VI: extraocular muscles  Primary gaze nystagmus, but she did look around the room   V: mastication    V: facial light touch sensation  Normal   V,VII: corneal reflex  Present   VII: facial muscle function - upper     VII: facial muscle function - lower Normal   VIII: hearing    IX: soft palate elevation     IX,X: gag reflex Present   XI: trapezius strength     XI: sternocleidomastoid strength    XI: neck extension strength     XII: tongue strength       Decreased bulk throughout    Sporadic movement of her left arm and lower extremities   Minimal sporadic movement noted in left arm    Withdrew to PP and vibration throughout   Pushed examiner away with left arm while assessing right arm    Spastic diplegia noted   pseudocontracture right arm    Currently in a wheelchair    Laboratory/Radiology:     CBC with Differential:    Lab Results   Component Value Date/Time    WBC 5.3 01/09/2025 03:16 PM    RBC 4.60 01/09/2025 03:16 PM    HGB 12.8 01/09/2025 03:16 PM

## 2025-02-18 RX ORDER — AMMONIUM LACTATE 12 G/100G
LOTION TOPICAL
Qty: 225 G | Refills: 5 | Status: SHIPPED | OUTPATIENT
Start: 2025-02-18

## 2025-02-18 NOTE — TELEPHONE ENCOUNTER
Name of Medication(s) Requested:  Requested Prescriptions     Pending Prescriptions Disp Refills    ammonium lactate (LAC-HYDRIN) 12 % lotion [Pharmacy Med Name: AMMONIUM LACTATE 12% LOTION] 225 g 5     Sig: APPLY TOPICALLY TO ELBOWS AND KNEES TWICE DAILY.       Medication is on current medication list Yes    Dosage and directions were verified? Yes    Quantity verified: 30 day supply     Pharmacy Verified?  Yes    Last Appointment:  11/13/2024    Future appts:  Future Appointments   Date Time Provider Department Center   2/5/2026 10:00 AM Kvng Wen APRN - CNS [facility] Neuro Neurology -        (If no appt send self scheduling link. .REFILLAPPT)  Scheduling request sent?     [] Yes  [x] No    Does patient need updated?  [] Yes  [x] No

## 2025-04-15 RX ORDER — BISMUTH SUBSALICYLATE 525 MG/15ML
LIQUID ORAL
Qty: 237 ML | Refills: 5 | Status: SHIPPED | OUTPATIENT
Start: 2025-04-15

## 2025-04-15 RX ORDER — IBUPROFEN 100 MG/5ML
SUSPENSION ORAL
Qty: 240 ML | Refills: 5 | Status: SHIPPED | OUTPATIENT
Start: 2025-04-15

## 2025-04-15 RX ORDER — MUPIROCIN 20 MG/G
OINTMENT TOPICAL
Qty: 22 G | Refills: 5 | Status: SHIPPED | OUTPATIENT
Start: 2025-04-15

## 2025-04-15 NOTE — TELEPHONE ENCOUNTER
Name of Medication(s) Requested:  Requested Prescriptions     Pending Prescriptions Disp Refills    mupirocin (BACTROBAN) 2 % ointment [Pharmacy Med Name: MUPIROCIN 2% OINTMENT] 22 g 5     Sig: APPLY TOPICALLY TO ABRASION ON NOSE THREE TIMES DAILY AS NEEDED FOR IRRITATION (1 TUBE EVERY 15 DAYS)    bismuth subsalicylate (STOMACH RELIEF EXTRA STRENGTH) 525 MG/15ML suspension [Pharmacy Med Name: STOMACH RELIEF 525 MG/15 ML] 237 mL 5     Sig: TAKE 2 TABLESPOONFULS (30ML) BY MOUTH EVERY 4 HOURS AS NEEDED FOR UPSET STOMACH OR DIARRHEA    ibuprofen (ADVIL;MOTRIN) 100 MG/5ML suspension [Pharmacy Med Name: IBUPROFEN 100 MG/5 ML SUSP] 240 mL 5     Sig: TAKE 2 TEASPOONFULS (10ML) EVERY 6 HOURS AS NEEDED FOR PAIN       Medication is on current medication list Yes    Dosage and directions were verified? Yes    Quantity verified: 30 day supply     Pharmacy Verified?  Yes    Last Appointment:  11/13/2024    Future appts:  Future Appointments   Date Time Provider Department Center   2/5/2026 10:00 AM Kvng Wen, DANI - CNS [facility]rology -        (If no appt send self scheduling link. .REFILLAPPT)  Scheduling request sent?     [] Yes  [x] No    Does patient need updated?  [] Yes  [x] No

## 2025-04-30 RX ORDER — FLUTICASONE PROPIONATE 50 MCG
SPRAY, SUSPENSION (ML) NASAL
Qty: 16 G | Refills: 5 | Status: SHIPPED | OUTPATIENT
Start: 2025-04-30

## 2025-04-30 NOTE — TELEPHONE ENCOUNTER
Name of Medication(s) Requested:  Requested Prescriptions     Pending Prescriptions Disp Refills    fluticasone (FLONASE) 50 MCG/ACT nasal spray [Pharmacy Med Name: FLUTICASONE PROP 50 MCG SPRAY] 16 g 5     Si SPRAYS INTO EACH NOSTRIL ONCE DAILY       Medication is on current medication list Yes    Dosage and directions were verified? Yes    Quantity verified: 30 day supply     Pharmacy Verified?  Yes    Last Appointment:  2024    Future appts:  Future Appointments   Date Time Provider Department Center   2025  2:15 PM [facility]   2026 10:00 AM Kvng Wen APRN - CNS [facility] Neurology -        (If no appt send self scheduling link. .REFILLAPPT)  Scheduling request sent?     [] Yes  [] No    Does patient need updated?  [] Yes  [] No

## 2025-05-14 RX ORDER — ERGOCALCIFEROL 1.25 MG/1
CAPSULE, LIQUID FILLED ORAL
Qty: 4 CAPSULE | Refills: 5 | Status: SHIPPED | OUTPATIENT
Start: 2025-05-14

## 2025-05-14 RX ORDER — TAMOXIFEN CITRATE 20 MG/1
TABLET ORAL
Qty: 31 TABLET | Refills: 5 | Status: SHIPPED | OUTPATIENT
Start: 2025-05-14

## 2025-05-14 NOTE — TELEPHONE ENCOUNTER
Name of Medication(s) Requested:  Requested Prescriptions     Pending Prescriptions Disp Refills    tamoxifen (NOLVADEX) 20 MG tablet [Pharmacy Med Name: TAMOXIFEN 20 MG TABLET] 31 tablet 5     Si TABLET BY MOUTH DAILY    vitamin D (ERGOCALCIFEROL) 1.25 MG (57376 UT) CAPS capsule [Pharmacy Med Name: VIT D2 1.25 MG (50,000 UNIT)] 4 capsule 5     Si CAPSULE BY MOUTH EVERY WEEK ON THURSDAY       Medication is on current medication list Yes    Dosage and directions were verified? Yes    Quantity verified: 30 day supply     Pharmacy Verified?  Yes    Last Appointment:  2024    Future appts:  Future Appointments   Date Time Provider Department Center   2025  2:15 PM [facility] [place] [place] [facility] [facility]   2026 10:00 AM Kvng Wen APRN - CNS [facility] -        (If no appt send self scheduling link. .REFILLAPPT)  Scheduling request sent?     [] Yes  [] No    Does patient need updated?  [] Yes  [] No

## 2025-05-28 RX ORDER — ACETAMINOPHEN 160 MG/5ML
LIQUID ORAL
Qty: 1 EACH | Refills: 11 | Status: SHIPPED | OUTPATIENT
Start: 2025-05-28

## 2025-05-28 NOTE — TELEPHONE ENCOUNTER
Name of Medication(s) Requested:  Requested Prescriptions     Pending Prescriptions Disp Refills    Acetaminophen Childrens 160 MG/5ML SOLN [Pharmacy Med Name: Q- MG/5 ML LIQUID] 1 each 11     Sig: TAKE 2 TEASPOONFULS (10ML) BY MOUTH EVERY 4 HOURS AS NEEDED FOR ELEVATED TEMPERATURE GREATER THAN 100.6       Medication is on current medication list Yes    Dosage and directions were verified? Yes    Quantity verified: 30 day supply     Pharmacy Verified?  Yes    Last Appointment:  11/13/2024    Future appts:  Future Appointments   Date Time Provider Department Center   5/30/2025  2:15 PM Mannie Quarles APRN - CNP [facility]   2/5/2026 10:00 AM Kvng Wen APRN - CNS [facility] Neurology -        (If no appt send self scheduling link. .REFILLAPPT)  Scheduling request sent?     [] Yes  [x] No    Does patient need updated?  [] Yes  [x] No

## 2025-05-30 ENCOUNTER — OFFICE VISIT (OUTPATIENT)
Dept: PRIMARY CARE CLINIC | Age: 35
End: 2025-05-30
Payer: MEDICARE

## 2025-05-30 VITALS
HEART RATE: 75 BPM | HEIGHT: 55 IN | TEMPERATURE: 97.8 F | WEIGHT: 71 LBS | OXYGEN SATURATION: 97 % | DIASTOLIC BLOOD PRESSURE: 55 MMHG | RESPIRATION RATE: 16 BRPM | SYSTOLIC BLOOD PRESSURE: 90 MMHG | BODY MASS INDEX: 16.43 KG/M2

## 2025-05-30 DIAGNOSIS — Z13.220 SCREENING FOR LIPOID DISORDERS: ICD-10-CM

## 2025-05-30 DIAGNOSIS — F41.9 ANXIETY: ICD-10-CM

## 2025-05-30 DIAGNOSIS — D69.6 THROMBOCYTOPENIA, UNSPECIFIED: ICD-10-CM

## 2025-05-30 DIAGNOSIS — G81.91 RIGHT HEMIPARESIS (HCC): ICD-10-CM

## 2025-05-30 DIAGNOSIS — E55.9 VITAMIN D DEFICIENCY: ICD-10-CM

## 2025-05-30 DIAGNOSIS — F79 MENTAL DISABILITY: Primary | ICD-10-CM

## 2025-05-30 DIAGNOSIS — Z13.29 SCREENING FOR THYROID DISORDER: ICD-10-CM

## 2025-05-30 DIAGNOSIS — G80.2 SPASTIC HEMIPLEGIC CEREBRAL PALSY (HCC): ICD-10-CM

## 2025-05-30 DIAGNOSIS — R56.9 SEIZURES (HCC): ICD-10-CM

## 2025-05-30 DIAGNOSIS — R53.83 FATIGUE, UNSPECIFIED TYPE: ICD-10-CM

## 2025-05-30 PROCEDURE — 99214 OFFICE O/P EST MOD 30 MIN: CPT | Performed by: NURSE PRACTITIONER

## 2025-05-30 SDOH — ECONOMIC STABILITY: FOOD INSECURITY: WITHIN THE PAST 12 MONTHS, YOU WORRIED THAT YOUR FOOD WOULD RUN OUT BEFORE YOU GOT MONEY TO BUY MORE.: NEVER TRUE

## 2025-05-30 SDOH — ECONOMIC STABILITY: FOOD INSECURITY: WITHIN THE PAST 12 MONTHS, THE FOOD YOU BOUGHT JUST DIDN'T LAST AND YOU DIDN'T HAVE MONEY TO GET MORE.: NEVER TRUE

## 2025-05-30 ASSESSMENT — PATIENT HEALTH QUESTIONNAIRE - PHQ9
SUM OF ALL RESPONSES TO PHQ QUESTIONS 1-9: 0
SUM OF ALL RESPONSES TO PHQ QUESTIONS 1-9: 0
2. FEELING DOWN, DEPRESSED OR HOPELESS: NOT AT ALL
SUM OF ALL RESPONSES TO PHQ QUESTIONS 1-9: 0
1. LITTLE INTEREST OR PLEASURE IN DOING THINGS: NOT AT ALL
1. LITTLE INTEREST OR PLEASURE IN DOING THINGS: NOT AT ALL
2. FEELING DOWN, DEPRESSED OR HOPELESS: NOT AT ALL
SUM OF ALL RESPONSES TO PHQ QUESTIONS 1-9: 0
SUM OF ALL RESPONSES TO PHQ9 QUESTIONS 1 & 2: 0

## 2025-05-30 ASSESSMENT — ENCOUNTER SYMPTOMS
BLOOD IN STOOL: 0
RECTAL PAIN: 0
ANAL BLEEDING: 0
COLOR CHANGE: 0
ABDOMINAL PAIN: 0

## 2025-05-30 NOTE — PROGRESS NOTES
Brandon Calix (:  1990) is a 35 y.o. female,Established patient, here for evaluation of the following chief complaint(s):  Annual Exam (6 month check up , lab review ) and Fatigue (Staff states she is tired and sleeping more then usual, no appetite.  )    History of Present Illness  The patient presents with fatigue, decreased appetite, and bowel irregularities.    She has exhibited unusual behavior over the past week, initially thought to be related to her menstrual cycle. She has experienced excessive sleepiness without fever, significantly decreased appetite, and vomiting on . She was uncommunicative on Monday but improved by Wednesday. Last blood work in 2025 by oncologist Jacqui Dela Cruz showed no anemia and normal B levels. She is on tamoxifen and Nerlynx.    Two months ago, her medication was switched from Keppra liquid to pills, during which she had a seizure-like episode with a blank stare and a large bowel movement. No similar episodes since.    Her bowel movements have been irregular, with the most recent being small, runny, and resembling a smear.      Assessment & Plan   ASSESSMENT/PLAN:  1. Mental disability  2. Spastic hemiplegic cerebral palsy (HCC)  Assessment & Plan:   Chronic, at goal (stable), continue current treatment plan  3. Seizures (HCC)  Assessment & Plan:   Chronic, at goal (stable), continue current treatment plan  Orders:  -     CBC with Auto Differential; Future  -     Comprehensive Metabolic Panel, Fasting; Future  -     Urinalysis; Future  4. Right hemiparesis (HCC)  5. Screening for thyroid disorder  -     TSH; Future  6. Thrombocytopenia, unspecified  7. Anxiety  8. Vitamin D deficiency  9. Fatigue, unspecified type  10. Screening for lipoid disorders  -     Lipid Panel; Future    Assessment & Plan  1. Fatigue: Suspected transient issue causing fatigue.  - Increased sleepiness and lack of appetite noted.  - No anemia; normal B levels.  - No new medications.  -

## 2025-07-11 DIAGNOSIS — F41.9 ANXIETY: ICD-10-CM

## 2025-07-11 RX ORDER — HYDROXYZINE HYDROCHLORIDE 25 MG/1
TABLET, FILM COATED ORAL
Qty: 30 TABLET | Refills: 11 | Status: SHIPPED | OUTPATIENT
Start: 2025-07-11

## 2025-07-11 NOTE — TELEPHONE ENCOUNTER
Name of Medication(s) Requested:  Requested Prescriptions     Pending Prescriptions Disp Refills    hydrOXYzine HCl (ATARAX) 25 MG tablet [Pharmacy Med Name: HYDROXYZINE HCL 25 MG TABLET] 30 tablet 11     Si TABLET BY MOUTH AT BEDTIME       Medication is on current medication list Yes    Dosage and directions were verified? Yes    Quantity verified: 30 day supply     Pharmacy Verified?  Yes    Last Appointment:  Visit date not found    Future appts:  Future Appointments   Date Time Provider Department Center   2025  8:45 AM SCHEDULE, [facility]   2025  3:00 PM Mannie Quarles APRN - CNP [facility]   2026 10:00 AM Kvng Wen APRN - CNS [facility] Neuro Neurology -        (If no appt send self scheduling link. .REFILLAPPT)  Scheduling request sent?     [] Yes  [] No    Does patient need updated?  [] Yes  [] No

## 2025-08-01 RX ORDER — ERGOCALCIFEROL 1.25 MG/1
50000 CAPSULE, LIQUID FILLED ORAL WEEKLY
Qty: 4 CAPSULE | Refills: 5 | Status: SHIPPED | OUTPATIENT
Start: 2025-08-01

## 2025-08-01 NOTE — TELEPHONE ENCOUNTER
Name of Medication(s) Requested:  Requested Prescriptions     Pending Prescriptions Disp Refills    vitamin D (ERGOCALCIFEROL) 1.25 MG (31815 UT) CAPS capsule 4 capsule 5     Sig: Take 1 capsule by mouth once a week       Medication is on current medication list Yes    Dosage and directions were verified? Yes    Quantity verified: 30 day supply     Pharmacy Verified?  Yes    Last Appointment:  5/30/2025    Future appts:  Future Appointments   Date Time Provider Department Center   11/28/2025  8:45 AM SCHEDULE, JULIO CÉSAR PIPER [facility] DEP   12/5/2025  3:00 PM Mannie Quarles APRN - CNP [facility]   2/5/2026 10:00 AM Kvng Wen APRN - CNS [facility] Neuro Neurology -        (If no appt send self scheduling link. .REFILLAPPT)  Scheduling request sent?     [] Yes  [x] No    Does patient need updated?  [] Yes  [x] No

## 2025-08-12 RX ORDER — CETIRIZINE HYDROCHLORIDE 10 MG/1
TABLET ORAL
Qty: 30 TABLET | Refills: 11 | Status: SHIPPED | OUTPATIENT
Start: 2025-08-12

## 2025-08-21 ENCOUNTER — TELEPHONE (OUTPATIENT)
Dept: PRIMARY CARE CLINIC | Age: 35
End: 2025-08-21

## (undated) DEVICE — SYRINGE,EAR/ULCER, 3 OZ, STERILE: Brand: MEDLINE

## (undated) DEVICE — TUBING SUCT 12FR MAL ALUM SHFT FN CAP VENT UNIV CONN W/ OBT

## (undated) DEVICE — SPONGE LAP W3/8XL1.5IN COT CYL CNTR STRUNG N RADPQ STRL

## (undated) DEVICE — PACKING,VAGINAL,XR,ST,4"X36",100EA: Brand: MEDLINE

## (undated) DEVICE — GOWN,SIRUS,FABRNF,L,20/CS: Brand: MEDLINE

## (undated) DEVICE — TOWEL,OR,DSP,ST,BLUE,STD,6/PK,12PK/CS: Brand: MEDLINE

## (undated) DEVICE — COVER,LIGHT HANDLE,FLX,2/PK: Brand: MEDLINE INDUSTRIES, INC.

## (undated) DEVICE — BASIC SINGLE BASIN 1-LF: Brand: MEDLINE INDUSTRIES, INC.

## (undated) DEVICE — MARKER,SKIN,WI/RULER AND LABELS: Brand: MEDLINE

## (undated) DEVICE — COUNTER NDL 30 COUNT DBL MAG

## (undated) DEVICE — JELLY PETROLEUM 5GR FOIL PK

## (undated) DEVICE — YANKAUER,OPEN TIP,W/O VENT,STERILE: Brand: MEDLINE INDUSTRIES, INC.

## (undated) DEVICE — SET PROPHY

## (undated) DEVICE — GOWN,SIRUS,FABRNF,XL,20/CS: Brand: MEDLINE

## (undated) DEVICE — SURGICAL PROCEDURE PACK EENT CUST

## (undated) DEVICE — GLOVE SURG SZ 65 THK91MIL LTX FREE SYN POLYISOPRENE